# Patient Record
Sex: MALE | Race: WHITE
[De-identification: names, ages, dates, MRNs, and addresses within clinical notes are randomized per-mention and may not be internally consistent; named-entity substitution may affect disease eponyms.]

---

## 2020-02-09 ENCOUNTER — HOSPITAL ENCOUNTER (INPATIENT)
Dept: HOSPITAL 5 - ED | Age: 50
LOS: 1 days | Discharge: HOME | DRG: 205 | End: 2020-02-10
Attending: INTERNAL MEDICINE | Admitting: INTERNAL MEDICINE
Payer: COMMERCIAL

## 2020-02-09 DIAGNOSIS — E87.5: ICD-10-CM

## 2020-02-09 DIAGNOSIS — Z82.49: ICD-10-CM

## 2020-02-09 DIAGNOSIS — M94.0: Primary | ICD-10-CM

## 2020-02-09 DIAGNOSIS — D64.9: ICD-10-CM

## 2020-02-09 DIAGNOSIS — Z83.3: ICD-10-CM

## 2020-02-09 DIAGNOSIS — F17.210: ICD-10-CM

## 2020-02-09 DIAGNOSIS — E43: ICD-10-CM

## 2020-02-09 DIAGNOSIS — Z71.3: ICD-10-CM

## 2020-02-09 LAB
%HYPO/RBC NFR BLD AUTO: (no result) %
ALBUMIN SERPL-MCNC: 4.4 G/DL (ref 3.9–5)
ALT SERPL-CCNC: 38 UNITS/L (ref 7–56)
ANISOCYTOSIS BLD QL SMEAR: (no result)
APTT BLD: 22.6 SEC. (ref 24.2–36.6)
APTT BLD: 25.9 SEC. (ref 24.2–36.6)
BAND NEUTROPHILS # (MANUAL): 0 K/MM3
BAND NEUTROPHILS # (MANUAL): 0 K/MM3
BUN SERPL-MCNC: 14 MG/DL (ref 9–20)
BUN/CREAT SERPL: 20 %
CALCIUM SERPL-MCNC: 9.7 MG/DL (ref 8.4–10.2)
DACRYOCYTES BLD QL SMEAR: (no result)
DACRYOCYTES BLD QL SMEAR: (no result)
HCT VFR BLD CALC: 13 % (ref 35.5–45.6)
HCT VFR BLD CALC: 13.9 % (ref 35.5–45.6)
HEMOLYSIS INDEX: 2
HGB BLD-MCNC: 4.5 GM/DL (ref 11.8–15.2)
HGB BLD-MCNC: 4.7 GM/DL (ref 11.8–15.2)
INR PPP: 1.06 (ref 0.87–1.13)
INR PPP: 1.11 (ref 0.87–1.13)
MCHC RBC AUTO-ENTMCNC: 34 % (ref 32–34)
MCHC RBC AUTO-ENTMCNC: 35 % (ref 32–34)
MCV RBC AUTO: 97 FL (ref 84–94)
MCV RBC AUTO: 98 FL (ref 84–94)
MYELOCYTES # (MANUAL): 0 K/MM3
MYELOCYTES # (MANUAL): 0 K/MM3
PLATELET # BLD: 131 K/MM3 (ref 140–440)
PLATELET # BLD: 139 K/MM3 (ref 140–440)
PROMYELOCYTES # (MANUAL): 0 K/MM3
PROMYELOCYTES # (MANUAL): 0 K/MM3
RBC # BLD AUTO: 1.34 M/MM3 (ref 3.65–5.03)
RBC # BLD AUTO: 1.41 M/MM3 (ref 3.65–5.03)
SPHEROCYTES BLD QL SMEAR: (no result)
TARGETS BLD QL SMEAR: (no result)
TARGETS BLD QL SMEAR: (no result)
TOTAL CELLS COUNTED BLD: 100
TOTAL CELLS COUNTED BLD: 100

## 2020-02-09 PROCEDURE — 86880 COOMBS TEST DIRECT: CPT

## 2020-02-09 PROCEDURE — 85007 BL SMEAR W/DIFF WBC COUNT: CPT

## 2020-02-09 PROCEDURE — 86900 BLOOD TYPING SEROLOGIC ABO: CPT

## 2020-02-09 PROCEDURE — 84484 ASSAY OF TROPONIN QUANT: CPT

## 2020-02-09 PROCEDURE — 82607 VITAMIN B-12: CPT

## 2020-02-09 PROCEDURE — 85025 COMPLETE CBC W/AUTO DIFF WBC: CPT

## 2020-02-09 PROCEDURE — G0378 HOSPITAL OBSERVATION PER HR: HCPCS

## 2020-02-09 PROCEDURE — 85610 PROTHROMBIN TIME: CPT

## 2020-02-09 PROCEDURE — 93005 ELECTROCARDIOGRAM TRACING: CPT

## 2020-02-09 PROCEDURE — 99406 BEHAV CHNG SMOKING 3-10 MIN: CPT

## 2020-02-09 PROCEDURE — 80048 BASIC METABOLIC PNL TOTAL CA: CPT

## 2020-02-09 PROCEDURE — 82271 OCCULT BLOOD OTHER SOURCES: CPT

## 2020-02-09 PROCEDURE — 87806 HIV AG W/HIV1&2 ANTB W/OPTIC: CPT

## 2020-02-09 PROCEDURE — 80053 COMPREHEN METABOLIC PANEL: CPT

## 2020-02-09 PROCEDURE — 83735 ASSAY OF MAGNESIUM: CPT

## 2020-02-09 PROCEDURE — 83690 ASSAY OF LIPASE: CPT

## 2020-02-09 PROCEDURE — 83550 IRON BINDING TEST: CPT

## 2020-02-09 PROCEDURE — 84443 ASSAY THYROID STIM HORMONE: CPT

## 2020-02-09 PROCEDURE — 36415 COLL VENOUS BLD VENIPUNCTURE: CPT

## 2020-02-09 PROCEDURE — 82747 ASSAY OF FOLIC ACID RBC: CPT

## 2020-02-09 PROCEDURE — P9016 RBC LEUKOCYTES REDUCED: HCPCS

## 2020-02-09 PROCEDURE — 70450 CT HEAD/BRAIN W/O DYE: CPT

## 2020-02-09 PROCEDURE — 30233N1 TRANSFUSION OF NONAUTOLOGOUS RED BLOOD CELLS INTO PERIPHERAL VEIN, PERCUTANEOUS APPROACH: ICD-10-PCS

## 2020-02-09 PROCEDURE — 71046 X-RAY EXAM CHEST 2 VIEWS: CPT

## 2020-02-09 PROCEDURE — 93010 ELECTROCARDIOGRAM REPORT: CPT

## 2020-02-09 PROCEDURE — 86901 BLOOD TYPING SEROLOGIC RH(D): CPT

## 2020-02-09 PROCEDURE — 84100 ASSAY OF PHOSPHORUS: CPT

## 2020-02-09 PROCEDURE — 82728 ASSAY OF FERRITIN: CPT

## 2020-02-09 PROCEDURE — 85730 THROMBOPLASTIN TIME PARTIAL: CPT

## 2020-02-09 PROCEDURE — 86850 RBC ANTIBODY SCREEN: CPT

## 2020-02-09 PROCEDURE — 74177 CT ABD & PELVIS W/CONTRAST: CPT

## 2020-02-09 PROCEDURE — 86920 COMPATIBILITY TEST SPIN: CPT

## 2020-02-09 NOTE — EMERGENCY DEPARTMENT REPORT
ED Dizziness HPI





- General


Chief Complaint: Dizziness


Stated Complaint: HEADACHE/DIZZY/STOMACH PAIN


Time Seen by Provider: 02/09/20 12:36


Source: patient


Mode of arrival: Ambulatory


Limitations: No Limitations





- History of Present Illness


Initial Comments: 





50-year-old [place] male without any known past medical or surgical history 

presents to the hospital complaining of lightheadedness, vomiting, and pain.  

Patient states he's had a global headache daily since December.   The last 3 

weeks he's had intermittent vomiting primarily with meals but is able to 

tolerate liquids.  Patient endorses weight loss during this period.  He also has

any shortness of breath, chest pain, or weakness, focal numbness, blurry vision,

and abdominal cramps.  Patient denies fever, melena, hematochezia, hematemesis, 

alcohol abuse, or recent travel.  Patient does speak English however his primary

language is Thai and Creole.  He denies a past medical history of anemia or 

requiring a blood transfusion in the past.





- Related Data


                                Home Medications











 Medication  Instructions  Recorded  Confirmed  Last Taken


 


No Known Home Medications [No  02/09/20 02/09/20 Unknown





Reported Home Medications]    











                                    Allergies











Allergy/AdvReac Type Severity Reaction Status Date / Time


 


No Known Allergies Allergy   Unverified 03/22/16 16:13














ED Review of Systems


ROS: 


Stated complaint: HEADACHE/DIZZY/STOMACH PAIN


Other details as noted in HPI





Comment: All other systems reviewed and negative





ED Past Medical Hx





- Past Medical History


Previous Medical History?: No





- Surgical History


Past Surgical History?: No





- Social History


Smoking Status: Former Smoker


Substance Use Type: None





- Medications


Home Medications: 


                                Home Medications











 Medication  Instructions  Recorded  Confirmed  Last Taken  Type


 


No Known Home Medications [No  02/09/20 02/09/20 Unknown History





Reported Home Medications]     














ED Physical Exam





- General


Limitations: No Limitations





- Other


Other exam information: 





General: No limitations, patient is alert in no acute distress


Head exam: Atraumatic, normocephalic


Eyes exam: Normal appearance, extraocular movements intact, pupils equal and 

reactive to light


ENT: Moist mucous membrane


Neck exam: Normal inspection, full range of motion


Respiratory exam: Clear to auscultation bilateral, no wheezes, rales, crackles


Cardiovascular: Regular rate and rhythm


Abdomen: Soft, nondistended, and  nontender, with normal bowel sounds, no 

rebound, or guarding


Rectal: Guaiac-negative brown stool


Extremity: No deformity


Back: Normal Inspection, no CVA tenderness


Neurologic: Alert, oriented x3, speech clear, no gross motor or sensory deficit,

finger nose finger function intact


Psychiatric: Normal mood, affect 


Skin: No rash





ED Course


                                   Vital Signs











  02/09/20 02/09/20 02/09/20





  15:19 19:20 19:40


 


Temperature 97.6 F 98.5 F 98.5 F


 


Pulse Rate 110 H 101 H 105 H


 


Respiratory 20 16 16





Rate   


 


Blood Pressure   155/74


 


Blood Pressure 150/75 145/68 





[Left]   


 


O2 Sat by Pulse 100 98 99





Oximetry   














  02/09/20 02/09/20 02/09/20





  19:55 20:10 20:40


 


Temperature 98.2 F 98.5 F 98.5 F


 


Pulse Rate 91 H 96 H 92 H


 


Respiratory 16 18 18





Rate   


 


Blood Pressure 136/68 136/76 122/70


 


Blood Pressure   





[Left]   


 


O2 Sat by Pulse 98 98 96





Oximetry   














ED Medical Decision Making





- Lab Data


Result diagrams: 


                                 02/09/20 16:00





                                 02/09/20 14:25








                                   Lab Results











  02/09/20 02/09/20 02/09/20 Range/Units





  14:25 14:25 14:25 


 


WBC  8.9    (4.5-11.0)  K/mm3


 


RBC  1.34 L    (3.65-5.03)  M/mm3


 


Hgb  4.5 L*    (11.8-15.2)  gm/dl


 


Hct  13.0 L*    (35.5-45.6)  %


 


MCV  97 H    (84-94)  fl


 


MCH  34 H    (28-32)  pg


 


MCHC  35 H    (32-34)  %


 


RDW  33.9 H    (13.2-15.2)  %


 


Plt Count  139 L    (140-440)  K/mm3


 


Add Manual Diff  Complete    


 


Total Counted  100    


 


Seg Neuts % (Manual)  55.0    (40.0-70.0)  %


 


Band Neutrophils %  0    %


 


Lymphocytes % (Manual)  43.0 H    (13.4-35.0)  %


 


Reactive Lymphs % (Man)  0    %


 


Monocytes % (Manual)  1.0    (0.0-7.3)  %


 


Eosinophils % (Manual)  0    (0.0-4.3)  %


 


Basophils % (Manual)  1.0    (0.0-1.8)  %


 


Metamyelocytes %  0    %


 


Myelocytes %  0    %


 


Promyelocytes %  0    %


 


Blast Cells %  0    %


 


Nucleated RBC %  1.0 H    (0.0-0.9)  %


 


Seg Neutrophils # Man  4.9    (1.8-7.7)  K/mm3


 


Band Neutrophils #  0.0    K/mm3


 


Lymphocytes # (Manual)  3.8    (1.2-5.4)  K/mm3


 


Abs React Lymphs (Man)  0.0    K/mm3


 


Monocytes # (Manual)  0.1    (0.0-0.8)  K/mm3


 


Eosinophils # (Manual)  0.0    (0.0-0.4)  K/mm3


 


Basophils # (Manual)  0.1    (0.0-0.1)  K/mm3


 


Metamyelocytes #  0.0    K/mm3


 


Myelocytes #  0.0    K/mm3


 


Promyelocytes #  0.0    K/mm3


 


Blast Cells #  0.0    K/mm3


 


WBC Morphology  Not Reportable    


 


Hypersegmented Neuts  Not Reportable    


 


Hyposegmented Neuts  Not Reportable    


 


Hypogranular Neuts  Not Reportable    


 


Smudge Cells  Not Reportable    


 


Toxic Granulation  Not Reportable    


 


Toxic Vacuolation  Not Reportable    


 


Dohle Bodies  Not Reportable    


 


Pelger-Huet Anomaly  Not Reportable    


 


Estefania Rods  Not Reportable    


 


Platelet Estimate  Consistent w auto    


 


Clumped Platelets  Not Reportable    


 


Plt Clumps, EDTA  Not Reportable    


 


Large Platelets  Not Reportable    


 


Giant Platelets  Not Reportable    


 


Platelet Satelliting  Not Reportable    


 


Plt Morphology Comment  Not Reportable    


 


RBC Morphology  Not Reportable    


 


Dimorphic RBCs  Not Reportable    


 


Polychromasia  Few    


 


Hypochromasia  1+    


 


Poikilocytosis  Not Reportable    


 


Anisocytosis  Not Reportable    


 


Microcytosis  Not Reportable    


 


Macrocytosis  Not Reportable    


 


Spherocytes  Few    


 


Pappenheimer Bodies  Not Reportable    


 


Sickle Cells  Not Reportable    


 


Target Cells  1+    


 


Tear Drop Cells  1+    


 


Ovalocytes  Not Reportable    


 


Helmet Cells  Not Reportable    


 


[place] Bodies  Not Reportable    


 


Cabot Rings  Not Reportable    


 


[place] Cells  Not Reportable    


 


Bite Cells  Not Reportable    


 


Crenated Cell  Not Reportable    


 


Elliptocytes  1+    


 


Acanthocytes (Spur)  Not Reportable    


 


Rouleaux  Not Reportable    


 


Hemoglobin C Crystals  Not Reportable    


 


Schistocytes  Not Reportable    


 


Malaria parasites  Not Reportable    


 


Guille Bodies  Not Reportable    


 


Hem Pathologist Commnt  No    


 


PT   14.4   (12.2-14.9)  Sec.


 


INR   1.11   (0.87-1.13)  


 


APTT   22.6 L   (24.2-36.6)  Sec.


 


Sodium    140  (137-145)  mmol/L


 


Potassium    4.0  (3.6-5.0)  mmol/L


 


Chloride    99.9  ()  mmol/L


 


Carbon Dioxide    25  (22-30)  mmol/L


 


Anion Gap    19  mmol/L


 


BUN    14  (9-20)  mg/dL


 


Creatinine    0.7 L  (0.8-1.5)  mg/dL


 


Estimated GFR    > 60  ml/min


 


BUN/Creatinine Ratio    20  %


 


Glucose    110 H  ()  mg/dL


 


Calcium    9.7  (8.4-10.2)  mg/dL


 


Phosphorus    4.40  (2.5-4.5)  mg/dL


 


Magnesium    1.80  (1.7-2.3)  mg/dL


 


Total Bilirubin    1.30 H  (0.1-1.2)  mg/dL


 


AST    76 H  (5-40)  units/L


 


ALT    38  (7-56)  units/L


 


Alkaline Phosphatase    62  ()  units/L


 


Troponin T    < 0.010  (0.00-0.029)  ng/mL


 


Total Protein    7.4  (6.3-8.2)  g/dL


 


Albumin    4.4  (3.9-5)  g/dL


 


Albumin/Globulin Ratio    1.5  %


 


Lipase     (13-60)  units/L


 


TSH     (0.270-4.200)  mlU/mL


 


Blood Type     


 


Antibody Screen     


 


Crossmatch     














  02/09/20 02/09/20 02/09/20 Range/Units





  14:25 16:00 16:00 


 


WBC    8.5  (4.5-11.0)  K/mm3


 


RBC    1.41 L  (3.65-5.03)  M/mm3


 


Hgb    4.7 L*  (11.8-15.2)  gm/dl


 


Hct    13.9 L*  (35.5-45.6)  %


 


MCV    98 H  (84-94)  fl


 


MCH    33 H  (28-32)  pg


 


MCHC    34  (32-34)  %


 


RDW    34.6 H  (13.2-15.2)  %


 


Plt Count    131 L  (140-440)  K/mm3


 


Add Manual Diff    Complete  


 


Total Counted    100  


 


Seg Neuts % (Manual)    43.0  (40.0-70.0)  %


 


Band Neutrophils %    0  %


 


Lymphocytes % (Manual)    53.0 H  (13.4-35.0)  %


 


Reactive Lymphs % (Man)    0  %


 


Monocytes % (Manual)    3.0  (0.0-7.3)  %


 


Eosinophils % (Manual)    1.0  (0.0-4.3)  %


 


Basophils % (Manual)    0  (0.0-1.8)  %


 


Metamyelocytes %    0  %


 


Myelocytes %    0  %


 


Promyelocytes %    0  %


 


Blast Cells %    0  %


 


Nucleated RBC %    Not Reportable  (0.0-0.9)  %


 


Seg Neutrophils # Man    3.7  (1.8-7.7)  K/mm3


 


Band Neutrophils #    0.0  K/mm3


 


Lymphocytes # (Manual)    4.5  (1.2-5.4)  K/mm3


 


Abs React Lymphs (Man)    0.0  K/mm3


 


Monocytes # (Manual)    0.3  (0.0-0.8)  K/mm3


 


Eosinophils # (Manual)    0.1  (0.0-0.4)  K/mm3


 


Basophils # (Manual)    0.0  (0.0-0.1)  K/mm3


 


Metamyelocytes #    0.0  K/mm3


 


Myelocytes #    0.0  K/mm3


 


Promyelocytes #    0.0  K/mm3


 


Blast Cells #    0.0  K/mm3


 


WBC Morphology    Not Reportable  


 


Hypersegmented Neuts    Not Reportable  


 


Hyposegmented Neuts    Not Reportable  


 


Hypogranular Neuts    Not Reportable  


 


Smudge Cells    Not Reportable  


 


Toxic Granulation    Not Reportable  


 


Toxic Vacuolation    Not Reportable  


 


Dohle Bodies    Not Reportable  


 


Pelger-Huet Anomaly    Not Reportable  


 


Estefania Rods    Not Reportable  


 


Platelet Estimate    Not Reportable  


 


Clumped Platelets    Not Reportable  


 


Plt Clumps, EDTA    Not Reportable  


 


Large Platelets    Not Reportable  


 


Giant Platelets    Not Reportable  


 


Platelet Satelliting    Not Reportable  


 


Plt Morphology Comment    Not Reportable  


 


RBC Morphology    Not Reportable  


 


Dimorphic RBCs    Not Reportable  


 


Polychromasia    1+  


 


Hypochromasia    Not Reportable  


 


Poikilocytosis    Not Reportable  


 


Anisocytosis    1+  


 


Microcytosis    Not Reportable  


 


Macrocytosis    Not Reportable  


 


Spherocytes    1+  


 


Pappenheimer Bodies    Not Reportable  


 


Sickle Cells    Not Reportable  


 


Target Cells    1+  


 


Tear Drop Cells    1+  


 


Ovalocytes    Not Reportable  


 


Helmet Cells    Not Reportable  


 


[place] Bodies    Not Reportable  


 


Cabot Rings    Not Reportable  


 


[place] Cells    Not Reportable  


 


Bite Cells    Not Reportable  


 


Crenated Cell    Not Reportable  


 


Elliptocytes    1+  


 


Acanthocytes (Spur)    Not Reportable  


 


Rouleaux    Not Reportable  


 


Hemoglobin C Crystals    Not Reportable  


 


Schistocytes    Not Reportable  


 


Malaria parasites    Not Reportable  


 


Guille Bodies    Not Reportable  


 


Hem Pathologist Commnt    No  


 


PT     (12.2-14.9)  Sec.


 


INR     (0.87-1.13)  


 


APTT     (24.2-36.6)  Sec.


 


Sodium     (137-145)  mmol/L


 


Potassium     (3.6-5.0)  mmol/L


 


Chloride     ()  mmol/L


 


Carbon Dioxide     (22-30)  mmol/L


 


Anion Gap     mmol/L


 


BUN     (9-20)  mg/dL


 


Creatinine     (0.8-1.5)  mg/dL


 


Estimated GFR     ml/min


 


BUN/Creatinine Ratio     %


 


Glucose     ()  mg/dL


 


Calcium     (8.4-10.2)  mg/dL


 


Phosphorus     (2.5-4.5)  mg/dL


 


Magnesium     (1.7-2.3)  mg/dL


 


Total Bilirubin     (0.1-1.2)  mg/dL


 


AST     (5-40)  units/L


 


ALT     (7-56)  units/L


 


Alkaline Phosphatase     ()  units/L


 


Troponin T   < 0.010   (0.00-0.029)  ng/mL


 


Total Protein     (6.3-8.2)  g/dL


 


Albumin     (3.9-5)  g/dL


 


Albumin/Globulin Ratio     %


 


Lipase     (13-60)  units/L


 


TSH  1.680    (0.270-4.200)  mlU/mL


 


Blood Type     


 


Antibody Screen     


 


Crossmatch     














  02/09/20 02/09/20 02/09/20 Range/Units





  16:00 16:00 16:00 


 


WBC     (4.5-11.0)  K/mm3


 


RBC     (3.65-5.03)  M/mm3


 


Hgb     (11.8-15.2)  gm/dl


 


Hct     (35.5-45.6)  %


 


MCV     (84-94)  fl


 


MCH     (28-32)  pg


 


MCHC     (32-34)  %


 


RDW     (13.2-15.2)  %


 


Plt Count     (140-440)  K/mm3


 


Add Manual Diff     


 


Total Counted     


 


Seg Neuts % (Manual)     (40.0-70.0)  %


 


Band Neutrophils %     %


 


Lymphocytes % (Manual)     (13.4-35.0)  %


 


Reactive Lymphs % (Man)     %


 


Monocytes % (Manual)     (0.0-7.3)  %


 


Eosinophils % (Manual)     (0.0-4.3)  %


 


Basophils % (Manual)     (0.0-1.8)  %


 


Metamyelocytes %     %


 


Myelocytes %     %


 


Promyelocytes %     %


 


Blast Cells %     %


 


Nucleated RBC %     (0.0-0.9)  %


 


Seg Neutrophils # Man     (1.8-7.7)  K/mm3


 


Band Neutrophils #     K/mm3


 


Lymphocytes # (Manual)     (1.2-5.4)  K/mm3


 


Abs React Lymphs (Man)     K/mm3


 


Monocytes # (Manual)     (0.0-0.8)  K/mm3


 


Eosinophils # (Manual)     (0.0-0.4)  K/mm3


 


Basophils # (Manual)     (0.0-0.1)  K/mm3


 


Metamyelocytes #     K/mm3


 


Myelocytes #     K/mm3


 


Promyelocytes #     K/mm3


 


Blast Cells #     K/mm3


 


WBC Morphology     


 


Hypersegmented Neuts     


 


Hyposegmented Neuts     


 


Hypogranular Neuts     


 


Smudge Cells     


 


Toxic Granulation     


 


Toxic Vacuolation     


 


Dohle Bodies     


 


Pelger-Huet Anomaly     


 


Estefania Rods     


 


Platelet Estimate     


 


Clumped Platelets     


 


Plt Clumps, EDTA     


 


Large Platelets     


 


Giant Platelets     


 


Platelet Satelliting     


 


Plt Morphology Comment     


 


RBC Morphology     


 


Dimorphic RBCs     


 


Polychromasia     


 


Hypochromasia     


 


Poikilocytosis     


 


Anisocytosis     


 


Microcytosis     


 


Macrocytosis     


 


Spherocytes     


 


Pappenheimer Bodies     


 


Sickle Cells     


 


Target Cells     


 


Tear Drop Cells     


 


Ovalocytes     


 


Helmet Cells     


 


[place] Bodies     


 


Cabot Rings     


 


[place] Cells     


 


Bite Cells     


 


Crenated Cell     


 


Elliptocytes     


 


Acanthocytes (Spur)     


 


Rouleaux     


 


Hemoglobin C Crystals     


 


Schistocytes     


 


Malaria parasites     


 


Guille Bodies     


 


Hem Pathologist Commnt     


 


PT    13.9  (12.2-14.9)  Sec.


 


INR    1.06  (0.87-1.13)  


 


APTT    25.9  (24.2-36.6)  Sec.


 


Sodium     (137-145)  mmol/L


 


Potassium     (3.6-5.0)  mmol/L


 


Chloride     ()  mmol/L


 


Carbon Dioxide     (22-30)  mmol/L


 


Anion Gap     mmol/L


 


BUN     (9-20)  mg/dL


 


Creatinine     (0.8-1.5)  mg/dL


 


Estimated GFR     ml/min


 


BUN/Creatinine Ratio     %


 


Glucose     ()  mg/dL


 


Calcium     (8.4-10.2)  mg/dL


 


Phosphorus     (2.5-4.5)  mg/dL


 


Magnesium  1.90    (1.7-2.3)  mg/dL


 


Total Bilirubin     (0.1-1.2)  mg/dL


 


AST     (5-40)  units/L


 


ALT     (7-56)  units/L


 


Alkaline Phosphatase     ()  units/L


 


Troponin T     (0.00-0.029)  ng/mL


 


Total Protein     (6.3-8.2)  g/dL


 


Albumin     (3.9-5)  g/dL


 


Albumin/Globulin Ratio     %


 


Lipase  10 L    (13-60)  units/L


 


TSH     (0.270-4.200)  mlU/mL


 


Blood Type   O POSITIVE   


 


Antibody Screen   Negative   


 


Crossmatch   See Detail   














- EKG Data


-: EKG Interpreted by Me


EKG shows normal: sinus rhythm, ST-T waves (no stemi or T-wave inversion)


Rate: normal (83)





- Radiology Data


Radiology results: report reviewed





CHEST 2 VIEWS





INDICATION:


CP.





COMPARISON:


3/22/2016





FINDINGS:


Support devices: None.





Heart: Within normal limits.


Lungs/Pleura: No acute air space or interstitial disease. No significant pleural

 effusion.





IMPRESSION: No acute finding








NONENHANCED CT SCAN OF THE HEAD:





INDICATION / CLINICAL INFORMATION:


50 years Male; ha x 3 months, n/v x 3 weeks.





TECHNIQUE: Routine CT head without contrast. All CT scans at this location are 

performed using CT


dose reduction for ALARA by means of automated exposure control.





COMPARISON:


None.





FINDINGS:





BRAIN / INTRACRANIAL CONTENTS: No acute hemorrhage, mass effect, midline shift, 

hydrocephalus, or


acute, large territorial infarct. Mild volume loss is seen in the cerebellar 

vermis and cerebellar


hemispheres. Cerebral hemispheres are normal.





CRANIOCERVICAL JUNCTION: No significant abnormality.





ORBITS: No significant abnormality of visualized orbits.





SINUSES / MASTOIDS: Left maxillary sinus is opacified with thickened bony walls 

suggesting chronic


inflammatory changes.





ADDITIONAL FINDINGS: None.





IMPRESSION:


Normal CT scan of the brain





Opacified left maxillary sinus with thickened bony walls suggesting chronic 

inflammatory change








CT abdomen pelvis w con





INDICATION / CLINICAL INFORMATION:


vomiting x 3 weeks, weight loss anemia.





TECHNIQUE:


All CT scans at this location are performed using CT dose reduction for ALARA by

 means of automated


exposure control.





COMPARISON:


None available.





FINDINGS:


Limited lower thoracic images are negative.





ABDOMEN:


The gallbladder, liver, spleen, pancreas and adrenal glands are normal.


No urinary calculi or hydronephrosis. The kidneys are normal.





No mesenteric or retroperitoneal adenopathy.


The small bowel is nondilated.





Pelvis:


No fluid collections are seen in the pelvis. No acute inflammatory findings.





Skeletal structures are normal.





IMPRESSION:


1. No acute findings.








- Medical Decision Making





Patient has severe symptomatic anemia of unknown cause.  Guaiac-negative.  

Reports no previous history of anemia.  CT head, chest x-ray, CT and pelvis with

 IV contrast did not show any acute abnormalities.  Cause of patient's 

persistent intermittent vomiting the last 3 weeks and weight loss unknown at t

his time as well.  Patient will be admitted to the hospitalist service for 

completion of the blood transfusion and further evaluation.  3 units of PRBCs 

ordered in the ED.





- Differential Diagnosis


anemia, arrhythmia, cancer, infection


Critical Care Time: No


Critical care attestation.: 


If time is entered above; I have spent that time in minutes in the direct care 

of this critically ill patient, excluding procedure time.








ED Disposition


Clinical Impression: 


 Severe anemia, Nausea and vomiting, Headache, Lightheadedness





Disposition: DC-09 OP ADMIT IP TO THIS HOSP


Is pt being admited?: Yes


Condition: Stable


Time of Disposition: 19:55 (Dr Thomas/hosp)

## 2020-02-09 NOTE — EVENT NOTE
ED Screening Note


ED Screening Note: 





pt presents for headache


chest pain 


generalized weakness


dizziness like the room is spinning


three weeks ago 


occasional vomiting


no diarrhea 


no sob 


PMHx none


no daily meds


no allergies to meds





This initial assessment/diagnostic orders/clinical plan/treatment(s) is/are 

subject to change based on patients health status, clinical progression and re-

assessment by fellow clinical providers in the ED. Further treatment and workup 

at subsequent clinical providers discretion. Patient/guardian urged not to elope

from the ED as their condition may be serious if not clinically assessed and 

managed. 





Initial orders include: andrea morris

## 2020-02-09 NOTE — CAT SCAN REPORT
CT abdomen pelvis w con 



INDICATION / CLINICAL INFORMATION:

vomiting x 3 weeks, weight loss anemia.



TECHNIQUE:

All CT scans at this location are performed using CT dose reduction for ALARA by means of automated e
xposure control. 



COMPARISON:

None available.



FINDINGS:

Limited lower thoracic images are negative.



ABDOMEN:

The gallbladder, liver, spleen, pancreas and adrenal glands are normal.

No urinary calculi or hydronephrosis. The kidneys are normal.



No mesenteric or retroperitoneal adenopathy.

The small bowel is nondilated.



Pelvis:

No fluid collections are seen in the pelvis. No acute inflammatory findings.



Skeletal structures are normal.



IMPRESSION:

1. No acute findings. 



Signer Name: Colby Lu MD 

Signed: 2/9/2020 6:46 PM

 Workstation Name: [id number]

## 2020-02-09 NOTE — XRAY REPORT
CHEST 2 VIEWS 



INDICATION: 

CP.



COMPARISON: 

3/22/2016



FINDINGS:

Support devices: None.



Heart: Within normal limits. 

Lungs/Pleura: No acute air space or interstitial disease.   No significant pleural effusion. 



IMPRESSION:  No acute findings.



Signer Name: Arnold Coleman MD 

Signed: 2/9/2020 2:10 PM

 Workstation Name: [id number]

## 2020-02-09 NOTE — CAT SCAN REPORT
NONENHANCED CT SCAN OF THE HEAD: 



INDICATION / CLINICAL INFORMATION:

50 years Male; ha x 3 months, n/v x 3 weeks.



TECHNIQUE: Routine CT head without contrast. All CT scans at this location are performed using CT dos
e reduction for ALARA by means of automated exposure control. 



COMPARISON: 

None.



FINDINGS:



BRAIN / INTRACRANIAL CONTENTS: No acute hemorrhage, mass effect, midline shift, hydrocephalus, or acu
te, large territorial infarct. Mild volume loss is seen in the cerebellar vermis and cerebellar hemis
pheres. Cerebral hemispheres are normal.



CRANIOCERVICAL JUNCTION: No significant abnormality.



ORBITS: No significant abnormality of visualized orbits.



SINUSES / MASTOIDS: Left maxillary sinus is opacified with thickened bony walls suggesting chronic in
flammatory changes.



ADDITIONAL FINDINGS: None. 



IMPRESSION:

Normal CT scan of the brain



Opacified left maxillary sinus with thickened bony walls suggesting chronic inflammatory changes



Signer Name: Vivien Hawthorne MD 

Signed: 2/9/2020 7:22 PM

 Workstation Name: [id number]

## 2020-02-09 NOTE — HISTORY AND PHYSICAL REPORT
History of Present Illness


Date of examination: 02/09/20


Date of admission: 


2/09/20





Chief complaint: 


"My head hurts and I do not feel good"





History of present illness: 


Patient is 50-year-old male patient with no prior medical history who presents 

to  ER with complaints of headache x2 months,  and chest pain x3 weeks.  

Patient reports that his headaches are accompanied by bouts of dizziness and 

weakness and these symptoms consistently only occur  whilst at work with usual 

activity. His chest pain occurs intermittently  with exertion, sometimes 

precipitated with nausea and or vomiting, it is without radiation and relieved 

by rest. He reports taking OTC Excedrin for his headache but have not sought 

additional care or have a PCP. 








Past History


Past Medical History: No medical history


Past Surgical History: No surgical history


Social history: smoking (x 15 years)


Family history: diabetes, hypertension





Medications and Allergies


                                    Allergies











Allergy/AdvReac Type Severity Reaction Status Date / Time


 


No Known Allergies Allergy   Unverified 03/22/16 16:13











                                Home Medications











 Medication  Instructions  Recorded  Confirmed  Last Taken  Type


 


No Known Home Medications [No  02/09/20 02/09/20 Unknown History





Reported Home Medications]     














Review of Systems


All systems: negative


Constitutional: chills, fatigue, weakness


Cardiovascular: lightheadedness


Neurological: weakness





Exam





- Physical Exam


Narrative exam: 


- Physical Exam


Narrative exam: 


General appearance: Present: No distress noted





- EENT


Eyes: Present: PERRL


ENT: hearing intact, clear oral mucosa





- Neck


Neck: Present: supple, normal ROM





- Respiratory


Respiratory effort: normal


Respiratory: bilateral: Clear to auscultation





- Cardiovascular


Heart rate:98


Heart Sounds: Present: S1 & S2.  Absent: rub, click





- Extremities


Extremities: pulses symmetrical, No edema


Peripheral Pulses: within normal limits





- Abdominal


General gastrointestinal: Present: , non-distended, normal bowel sounds


genitourinary: Present: normal





- Integumentary


Integumentary: Present: clear, warm, dry





- Musculoskeletal


Musculoskeletal: gait normal, strength equal bilaterally





- Psychiatric


Psychiatric: appropriate mood/affect, intact judgment & insight





- Neurologic


Neurologic: CNII-XII intact, moves all extremities








- Constitutional


Vitals: 


                                        











Temp Pulse Resp BP Pulse Ox


 


 98.5 F   105 H  16   155/74   99 


 


 02/09/20 19:40  02/09/20 19:40  02/09/20 19:40  02/09/20 19:40  02/09/20 19:40














Results





- Labs


CBC & Chem 7: 


                                 02/09/20 16:00





                                 02/09/20 14:25


Labs: 


                             Laboratory Last Values











WBC  8.5 K/mm3 (4.5-11.0)   02/09/20  16:00    


 


RBC  1.41 M/mm3 (3.65-5.03)  L  02/09/20  16:00    


 


Hgb  4.7 gm/dl (11.8-15.2)  L*  02/09/20  16:00    


 


Hct  13.9 % (35.5-45.6)  L*  02/09/20  16:00    


 


MCV  98 fl (84-94)  H  02/09/20  16:00    


 


MCH  33 pg (28-32)  H  02/09/20  16:00    


 


MCHC  34 % (32-34)   02/09/20  16:00    


 


RDW  34.6 % (13.2-15.2)  H  02/09/20  16:00    


 


Plt Count  131 K/mm3 (140-440)  L  02/09/20  16:00    


 


Add Manual Diff  Complete   02/09/20  16:00    


 


Total Counted  100   02/09/20  16:00    


 


Seg Neuts % (Manual)  43.0 % (40.0-70.0)   02/09/20  16:00    


 


Band Neutrophils %  0 %  02/09/20  16:00    


 


Lymphocytes % (Manual)  53.0 % (13.4-35.0)  H  02/09/20  16:00    


 


Reactive Lymphs % (Man)  0 %  02/09/20  16:00    


 


Monocytes % (Manual)  3.0 % (0.0-7.3)   02/09/20  16:00    


 


Eosinophils % (Manual)  1.0 % (0.0-4.3)   02/09/20  16:00    


 


Basophils % (Manual)  0 % (0.0-1.8)   02/09/20  16:00    


 


Metamyelocytes %  0 %  02/09/20  16:00    


 


Myelocytes %  0 %  02/09/20  16:00    


 


Promyelocytes %  0 %  02/09/20  16:00    


 


Blast Cells %  0 %  02/09/20  16:00    


 


Nucleated RBC %  Not Reportable   02/09/20  16:00    


 


Seg Neutrophils # Man  3.7 K/mm3 (1.8-7.7)   02/09/20  16:00    


 


Band Neutrophils #  0.0 K/mm3  02/09/20  16:00    


 


Lymphocytes # (Manual)  4.5 K/mm3 (1.2-5.4)   02/09/20  16:00    


 


Abs React Lymphs (Man)  0.0 K/mm3  02/09/20  16:00    


 


Monocytes # (Manual)  0.3 K/mm3 (0.0-0.8)   02/09/20  16:00    


 


Eosinophils # (Manual)  0.1 K/mm3 (0.0-0.4)   02/09/20  16:00    


 


Basophils # (Manual)  0.0 K/mm3 (0.0-0.1)   02/09/20  16:00    


 


Metamyelocytes #  0.0 K/mm3  02/09/20  16:00    


 


Myelocytes #  0.0 K/mm3  02/09/20  16:00    


 


Promyelocytes #  0.0 K/mm3  02/09/20  16:00    


 


Blast Cells #  0.0 K/mm3  02/09/20  16:00    


 


WBC Morphology  Not Reportable   02/09/20  16:00    


 


Hypersegmented Neuts  Not Reportable   02/09/20  16:00    


 


Hyposegmented Neuts  Not Reportable   02/09/20  16:00    


 


Hypogranular Neuts  Not Reportable   02/09/20  16:00    


 


Smudge Cells  Not Reportable   02/09/20  16:00    


 


Toxic Granulation  Not Reportable   02/09/20  16:00    


 


Toxic Vacuolation  Not Reportable   02/09/20  16:00    


 


Dohle Bodies  Not Reportable   02/09/20  16:00    


 


Pelger-Huet Anomaly  Not Reportable   02/09/20  16:00    


 


Estefania Rods  Not Reportable   02/09/20  16:00    


 


Platelet Estimate  Not Reportable   02/09/20  16:00    


 


Clumped Platelets  Not Reportable   02/09/20  16:00    


 


Plt Clumps, EDTA  Not Reportable   02/09/20  16:00    


 


Large Platelets  Not Reportable   02/09/20  16:00    


 


Giant Platelets  Not Reportable   02/09/20  16:00    


 


Platelet Satelliting  Not Reportable   02/09/20  16:00    


 


Plt Morphology Comment  Not Reportable   02/09/20  16:00    


 


RBC Morphology  Not Reportable   02/09/20  16:00    


 


Dimorphic RBCs  Not Reportable   02/09/20  16:00    


 


Polychromasia  1+   02/09/20  16:00    


 


Hypochromasia  Not Reportable   02/09/20  16:00    


 


Poikilocytosis  Not Reportable   02/09/20  16:00    


 


Anisocytosis  1+   02/09/20  16:00    


 


Microcytosis  Not Reportable   02/09/20  16:00    


 


Macrocytosis  Not Reportable   02/09/20  16:00    


 


Spherocytes  1+   02/09/20  16:00    


 


Pappenheimer Bodies  Not Reportable   02/09/20  16:00    


 


Sickle Cells  Not Reportable   02/09/20  16:00    


 


Target Cells  1+   02/09/20  16:00    


 


Tear Drop Cells  1+   02/09/20  16:00    


 


Ovalocytes  Not Reportable   02/09/20  16:00    


 


Helmet Cells  Not Reportable   02/09/20  16:00    


 


Steen-Grasston Bodies  Not Reportable   02/09/20  16:00    


 


Cabot Rings  Not Reportable   02/09/20  16:00    


 


Moniac Cells  Not Reportable   02/09/20  16:00    


 


Bite Cells  Not Reportable   02/09/20  16:00    


 


Crenated Cell  Not Reportable   02/09/20  16:00    


 


Elliptocytes  1+   02/09/20  16:00    


 


Acanthocytes (Spur)  Not Reportable   02/09/20  16:00    


 


Rouleaux  Not Reportable   02/09/20  16:00    


 


Hemoglobin C Crystals  Not Reportable   02/09/20  16:00    


 


Schistocytes  Not Reportable   02/09/20  16:00    


 


Malaria parasites  Not Reportable   02/09/20  16:00    


 


Guille Bodies  Not Reportable   02/09/20  16:00    


 


Hem Pathologist Commnt  No   02/09/20  16:00    


 


PT  13.9 Sec. (12.2-14.9)   02/09/20  16:00    


 


INR  1.06  (0.87-1.13)   02/09/20  16:00    


 


APTT  25.9 Sec. (24.2-36.6)   02/09/20  16:00    


 


Sodium  140 mmol/L (137-145)   02/09/20  14:25    


 


Potassium  4.0 mmol/L (3.6-5.0)   02/09/20  14:25    


 


Chloride  99.9 mmol/L ()   02/09/20  14:25    


 


Carbon Dioxide  25 mmol/L (22-30)   02/09/20  14:25    


 


Anion Gap  19 mmol/L  02/09/20  14:25    


 


BUN  14 mg/dL (9-20)   02/09/20  14:25    


 


Creatinine  0.7 mg/dL (0.8-1.5)  L  02/09/20  14:25    


 


Estimated GFR  > 60 ml/min  02/09/20  14:25    


 


BUN/Creatinine Ratio  20 %  02/09/20  14:25    


 


Glucose  110 mg/dL ()  H  02/09/20  14:25    


 


Calcium  9.7 mg/dL (8.4-10.2)   02/09/20  14:25    


 


Phosphorus  4.40 mg/dL (2.5-4.5)   02/09/20  14:25    


 


Magnesium  1.90 mg/dL (1.7-2.3)   02/09/20  16:00    


 


Total Bilirubin  1.30 mg/dL (0.1-1.2)  H  02/09/20  14:25    


 


AST  76 units/L (5-40)  H  02/09/20  14:25    


 


ALT  38 units/L (7-56)   02/09/20  14:25    


 


Alkaline Phosphatase  62 units/L ()   02/09/20  14:25    


 


Troponin T  < 0.010 ng/mL (0.00-0.029)   02/09/20  16:00    


 


Total Protein  7.4 g/dL (6.3-8.2)   02/09/20  14:25    


 


Albumin  4.4 g/dL (3.9-5)   02/09/20  14:25    


 


Albumin/Globulin Ratio  1.5 %  02/09/20  14:25    


 


Lipase  10 units/L (13-60)  L  02/09/20  16:00    


 


TSH  1.680 mlU/mL (0.270-4.200)   02/09/20  14:25    


 


Blood Type  O POSITIVE   02/09/20  16:00    


 


Antibody Screen  Negative   02/09/20  16:00    


 


Crossmatch  See Detail   02/09/20  16:00    














- Imaging and Cardiology


EKG: report reviewed (Sinus rhythm)


Imaging and Cardiology: 


CT abdomen pelvis w con INDICATION / 





CLINICAL INFORMATION: vomiting x 3 weeks, weight loss anemia. TECHNIQUE: All CT 

scans at this location are performed using CT dose reduction for ALARA by means 

of automated exposure control. COMPARISON: None available. 





FINDINGS: Limited lower thoracic images are negative. ABDOMEN: The gallbladder, 

liver, spleen, pancreas and adrenal glands are normal. No urinary calculi or 

hydronephrosis. The kidneys are normal. No mesenteric or retroperitoneal 

adenopathy. The small bowel is nondilated. Pelvis: No fluid collections are seen

in the pelvis. No acute inflammatory findings. Skeletal structures are normal. 





IMPRESSION: 1. No acute findings. 








 CT SCAN OF THE HEAD: 





INDICATION / CLINICAL INFORMATION: 50 years Male; ha x 3 months, n/v x 3 weeks. 

TECHNIQUE: Routine CT head without contrast. All CT scans at this location are 

performed using CT dose reduction for ALARA by means of automated exposure contr

ol. COMPARISON: None. 





FINDINGS: BRAIN / INTRACRANIAL CONTENTS: No acute hemorrhage, mass effect, m

idline shift, hydrocephalus, or acute, large territorial infarct. Mild volume 

loss is seen in the cerebellar vermis and cerebellar hemispheres. Cerebral 

hemispheres are normal. CRANIOCERVICAL JUNCTION: No significant abnormality. 

ORBITS: No significant abnormality of visualized orbits. SINUSES / MASTOIDS: 

Left maxillary sinus is opacified with thickened bony walls suggesting chronic 

inflammatory changes. 





ADDITIONAL FINDINGS: None. 





IMPRESSION: Normal CT scan of the brain Opacified left maxillary sinus with 

thickened bony walls suggesting chronic inflammatory changes 











CHEST 2 VIEWS





INDICATION:


CP.





COMPARISON:


3/22/2016





FINDINGS:


Support devices: None.





Heart: Within normal limits.


Lungs/Pleura: No acute air space or interstitial disease. No significant pleural

effusion.





IMPRESSION: No acute finding 








Assessment and Plan


Assessment and plan: 





Anemia 


-Severe


-Hb: 4.7g/dl 


-3 units transfused in the ER 


-Monitor labs


-Heme consult





Headache


-x 2 months


-Accompanied c/ dizziness


-CT head negative


-PRN pain med





Nausea/ vomiting


-Anti-emetic prn


-IV fluids


-Supportive care





Chest pain


-x 3 weeks


- will admit to telemetry bed


- monitor with serial CE and EKG


- will place on Aspirin, statin


- as needed SL NTG and iv morphin for pain


- Monitor BP, 


 


 DVT prophylaxis


-SCDs bilateral extremities


-Patient ambulatory





Advance Directives: No


VTE prophylaxis?: Mechanical


Plan of care discussed with patient/family: Yes

## 2020-02-10 VITALS — SYSTOLIC BLOOD PRESSURE: 128 MMHG | DIASTOLIC BLOOD PRESSURE: 67 MMHG

## 2020-02-10 LAB
ANISOCYTOSIS BLD QL SMEAR: (no result)
BAND NEUTROPHILS # (MANUAL): 0 K/MM3
BUN SERPL-MCNC: 11 MG/DL (ref 9–20)
BUN/CREAT SERPL: 14 %
CALCIUM SERPL-MCNC: 9.1 MG/DL (ref 8.4–10.2)
HCT VFR BLD CALC: 22.2 % (ref 35.5–45.6)
HEMOLYSIS INDEX: 8
HGB BLD-MCNC: 7.8 GM/DL (ref 11.8–15.2)
IRON SERPL-MCNC: 151 UG/DL (ref 49–181)
MACROCYTES BLD QL SMEAR: (no result)
MCHC RBC AUTO-ENTMCNC: 35 % (ref 32–34)
MCV RBC AUTO: 96 FL (ref 84–94)
MYELOCYTES # (MANUAL): 0 K/MM3
OVALOCYTES BLD QL SMEAR: (no result)
PLATELET # BLD: 104 K/MM3 (ref 140–440)
POIKILOCYTOSIS BLD QL SMEAR: (no result)
PROMYELOCYTES # (MANUAL): 0 K/MM3
RBC # BLD AUTO: 2.3 M/MM3 (ref 3.65–5.03)
TIBC SERPL-MCNC: 128 MCG/DL (ref 250–450)
TOTAL CELLS COUNTED BLD: 100

## 2020-02-10 RX ADMIN — FAMOTIDINE SCH MG: 10 INJECTION, SOLUTION INTRAVENOUS at 09:37

## 2020-02-10 RX ADMIN — FAMOTIDINE SCH MG: 10 INJECTION, SOLUTION INTRAVENOUS at 01:27

## 2020-02-10 NOTE — GASTROENTEROLOGY CONSULTATION
<DURAN TORRES - Last Filed: 02/10/20 13:38>





History of Present Illness





- Reason for Consult


Consult date: 02/10/20


anemia


Requesting physician: LIZ MEZA





- History of Present Illness


Patient is a 51 y/o male with PMH of tobacco dependency who presented to ED with

c/o headache, lightheadedness, weakness, and CP with symptoms exacerbated with 

physical activity. Upon admission, he was found to be severely anemic with H/H 

4.5/13.0 to which he was admitted and GI has been consulted. This morning 

patient was sitting on the side of the bed w/o acute distress. Reports feeling 

better s/p blood transfusion with symptoms now improved. No active signs of 

bleeding such as hematemesis, melena, or hematochezia. Had prior N/V which has 

now resolved and recent wt loss. Denies abd pain, diarrhea, or constipation. 

Admits to NSAID use at home but no hx of PUD. No previous EGD/colonoscopy. No 

known Fhx of GI cancers. Abd CT w/o acute findings. 





Past History


Past Medical History: No medical history


Past Surgical History: No surgical history


Social history: smoking (x 15 years)


Family history: diabetes, hypertension





Medications and Allergies


                                    Allergies











Allergy/AdvReac Type Severity Reaction Status Date / Time


 


No Known Allergies Allergy   Unverified 03/22/16 16:13











                                Home Medications











 Medication  Instructions  Recorded  Confirmed  Last Taken  Type


 


Pantoprazole [Protonix TAB] 40 mg PO QDAY #30 tablet 02/10/20  Unknown Rx











Active Meds: 


Active Medications





Acetaminophen (Tylenol)  650 mg PO Q4H PRN


   PRN Reason: Pain MILD(1-3)/Fever >100.5/HA


Atorvastatin Calcium (Lipitor)  40 mg PO QHS [facility]


   Last Admin: 02/10/20 01:26 Dose:  40 mg


   Documented by: 


Famotidine (Pepcid)  20 mg IV BID [facility]


   Last Admin: 02/10/20 09:37 Dose:  20 mg


   Documented by: 


Hydralazine HCl (Apresoline)  10 mg IV Q4H PRN


   PRN Reason: Hypertension


Morphine Sulfate (Morphine)  2 mg IV Q4H PRN


   PRN Reason: Pain, Moderate (4-6)


Ondansetron HCl (Zofran)  4 mg IV Q8H PRN


   PRN Reason: Nausea And Vomiting


Polyethylene Glycol/Electrolytes (Golytely)  4,000 ml PO ONCE ONE


   Stop: 02/10/20 15:01





medications reviewed/updated as required





Review of Systems





- Review of Systems


All systems: negative


Constitutional: weight loss, weakness


Cardiovascular: chest pain


Gastrointestinal: nausea, vomiting, no abdominal pain, no hematemesis, no 

melena, no hematochezia


Neurological: other (headache)





Exam





- Constitutional


Vital Signs: 


                                        











Temp Pulse Resp BP Pulse Ox


 


 98.0 F   69   16   128/67   100 


 


 02/10/20 12:14  02/10/20 12:14  02/10/20 12:14  02/10/20 12:14  02/10/20 12:14











General appearance: no acute distress, other (thin appearing)





- EENT


Eyes: PERRL, EOM intact


ENT: hearing intact





- Respiratory


Respiratory effort: normal


Respiratory: bilateral: CTA





- Cardiovascular


Rhythm: regular





- Gastrointestinal


General gastrointestinal: Present: soft, non-tender, non-distended, normal bowel

sounds





- Integumentary


Integumentary: Present: warm, dry





- Neurologic


Neurological: alert and oriented x3





- Labs


CBC & Chem 7: 


                                 02/10/20 07:00





                                 02/10/20 07:00


Lab Results: 


                         Laboratory Results - last 24 hr











  02/09/20 02/09/20 02/09/20





  14:25 14:25 14:25


 


WBC  8.9  


 


RBC  1.34 L  


 


Hgb  4.5 L*  


 


Hct  13.0 L*  


 


MCV  97 H  


 


MCH  34 H  


 


MCHC  35 H  


 


RDW  33.9 H  


 


Plt Count  139 L  


 


Lymph % (Auto)   


 


Lymph #   


 


Add Manual Diff  Complete  


 


Total Counted  100  


 


Seg Neuts % (Manual)  55.0  


 


Band Neutrophils %  0  


 


Lymphocytes % (Manual)  43.0 H  


 


Reactive Lymphs % (Man)  0  


 


Monocytes % (Manual)  1.0  


 


Eosinophils % (Manual)  0  


 


Basophils % (Manual)  1.0  


 


Metamyelocytes %  0  


 


Myelocytes %  0  


 


Promyelocytes %  0  


 


Blast Cells %  0  


 


Nucleated RBC %  1.0 H  


 


Seg Neutrophils # Man  4.9  


 


Band Neutrophils #  0.0  


 


Lymphocytes # (Manual)  3.8  


 


Abs React Lymphs (Man)  0.0  


 


Monocytes # (Manual)  0.1  


 


Eosinophils # (Manual)  0.0  


 


Basophils # (Manual)  0.1  


 


Metamyelocytes #  0.0  


 


Myelocytes #  0.0  


 


Promyelocytes #  0.0  


 


Blast Cells #  0.0  


 


WBC Morphology  Not Reportable  


 


Hypersegmented Neuts  Not Reportable  


 


Hyposegmented Neuts  Not Reportable  


 


Hypogranular Neuts  Not Reportable  


 


Smudge Cells  Not Reportable  


 


Toxic Granulation  Not Reportable  


 


Toxic Vacuolation  Not Reportable  


 


Dohle Bodies  Not Reportable  


 


Pelger-Huet Anomaly  Not Reportable  


 


Estefania Rods  Not Reportable  


 


Platelet Estimate  Consistent w auto  


 


Clumped Platelets  Not Reportable  


 


Plt Clumps, EDTA  Not Reportable  


 


Large Platelets  Not Reportable  


 


Giant Platelets  Not Reportable  


 


Platelet Satelliting  Not Reportable  


 


Plt Morphology Comment  Not Reportable  


 


RBC Morphology  Not Reportable  


 


Dimorphic RBCs  Not Reportable  


 


Polychromasia  Few  


 


Hypochromasia  1+  


 


Poikilocytosis  Not Reportable  


 


Anisocytosis  Not Reportable  


 


Microcytosis  Not Reportable  


 


Macrocytosis  Not Reportable  


 


Spherocytes  Few  


 


Pappenheimer Bodies  Not Reportable  


 


Sickle Cells  Not Reportable  


 


Target Cells  1+  


 


Tear Drop Cells  1+  


 


Ovalocytes  Not Reportable  


 


Helmet Cells  Not Reportable  


 


Steen-Fishhook Bodies  Not Reportable  


 


Cabot Rings  Not Reportable  


 


Monica Cells  Not Reportable  


 


Bite Cells  Not Reportable  


 


Crenated Cell  Not Reportable  


 


Elliptocytes  1+  


 


Acanthocytes (Spur)  Not Reportable  


 


Rouleaux  Not Reportable  


 


Hemoglobin C Crystals  Not Reportable  


 


Schistocytes  Not Reportable  


 


Malaria parasites  Not Reportable  


 


Guille Bodies  Not Reportable  


 


Hem Pathologist Commnt  No  


 


PT   14.4 


 


INR   1.11 


 


APTT   22.6 L 


 


Sodium    140


 


Potassium    4.0


 


Chloride    99.9


 


Carbon Dioxide    25


 


Anion Gap    19


 


BUN    14


 


Creatinine    0.7 L


 


Estimated GFR    > 60


 


BUN/Creatinine Ratio    20


 


Glucose    110 H


 


Calcium    9.7


 


Phosphorus    4.40


 


Magnesium    1.80


 


Iron   


 


TIBC   


 


Ferritin   


 


Total Bilirubin    1.30 H


 


AST    76 H


 


ALT    38


 


Alkaline Phosphatase    62


 


Troponin T    < 0.010


 


Total Protein    7.4


 


Albumin    4.4


 


Albumin/Globulin Ratio    1.5


 


Lipase   


 


Vitamin B12   


 


Folate   


 


TSH   


 


HIV 1&2 Antibody Rapid   


 


HIV P24 Antigen   


 


Blood Type   


 


Antibody Screen   


 


Direct Antiglob Test   


 


FADUMO, Poly Interpret   


 


Crossmatch   














  02/09/20 02/09/20 02/09/20





  14:25 16:00 16:00


 


WBC    8.5


 


RBC    1.41 L


 


Hgb    4.7 L*


 


Hct    13.9 L*


 


MCV    98 H


 


MCH    33 H


 


MCHC    34


 


RDW    34.6 H


 


Plt Count    131 L


 


Lymph % (Auto)   


 


Lymph #   


 


Add Manual Diff    Complete


 


Total Counted    100


 


Seg Neuts % (Manual)    43.0


 


Band Neutrophils %    0


 


Lymphocytes % (Manual)    53.0 H


 


Reactive Lymphs % (Man)    0


 


Monocytes % (Manual)    3.0


 


Eosinophils % (Manual)    1.0


 


Basophils % (Manual)    0


 


Metamyelocytes %    0


 


Myelocytes %    0


 


Promyelocytes %    0


 


Blast Cells %    0


 


Nucleated RBC %    Not Reportable


 


Seg Neutrophils # Man    3.7


 


Band Neutrophils #    0.0


 


Lymphocytes # (Manual)    4.5


 


Abs React Lymphs (Man)    0.0


 


Monocytes # (Manual)    0.3


 


Eosinophils # (Manual)    0.1


 


Basophils # (Manual)    0.0


 


Metamyelocytes #    0.0


 


Myelocytes #    0.0


 


Promyelocytes #    0.0


 


Blast Cells #    0.0


 


WBC Morphology    Not Reportable


 


Hypersegmented Neuts    Not Reportable


 


Hyposegmented Neuts    Not Reportable


 


Hypogranular Neuts    Not Reportable


 


Smudge Cells    Not Reportable


 


Toxic Granulation    Not Reportable


 


Toxic Vacuolation    Not Reportable


 


Dohle Bodies    Not Reportable


 


Pelger-Huet Anomaly    Not Reportable


 


Estefania Rods    Not Reportable


 


Platelet Estimate    Not Reportable


 


Clumped Platelets    Not Reportable


 


Plt Clumps, EDTA    Not Reportable


 


Large Platelets    Not Reportable


 


Giant Platelets    Not Reportable


 


Platelet Satelliting    Not Reportable


 


Plt Morphology Comment    Not Reportable


 


RBC Morphology    Not Reportable


 


Dimorphic RBCs    Not Reportable


 


Polychromasia    1+


 


Hypochromasia    Not Reportable


 


Poikilocytosis    Not Reportable


 


Anisocytosis    1+


 


Microcytosis    Not Reportable


 


Macrocytosis    Not Reportable


 


Spherocytes    1+


 


Pappenheimer Bodies    Not Reportable


 


Sickle Cells    Not Reportable


 


Target Cells    1+


 


Tear Drop Cells    1+


 


Ovalocytes    Not Reportable


 


Helmet Cells    Not Reportable


 


Steen-Fishhook Bodies    Not Reportable


 


Cabot Rings    Not Reportable


 


Monica Cells    Not Reportable


 


Bite Cells    Not Reportable


 


Crenated Cell    Not Reportable


 


Elliptocytes    1+


 


Acanthocytes (Spur)    Not Reportable


 


Rouleaux    Not Reportable


 


Hemoglobin C Crystals    Not Reportable


 


Schistocytes    Not Reportable


 


Malaria parasites    Not Reportable


 


Guille Bodies    Not Reportable


 


Hem Pathologist Commnt    No


 


PT   


 


INR   


 


APTT   


 


Sodium   


 


Potassium   


 


Chloride   


 


Carbon Dioxide   


 


Anion Gap   


 


BUN   


 


Creatinine   


 


Estimated GFR   


 


BUN/Creatinine Ratio   


 


Glucose   


 


Calcium   


 


Phosphorus   


 


Magnesium   


 


Iron   


 


TIBC   


 


Ferritin   


 


Total Bilirubin   


 


AST   


 


ALT   


 


Alkaline Phosphatase   


 


Troponin T   < 0.010 


 


Total Protein   


 


Albumin   


 


Albumin/Globulin Ratio   


 


Lipase   


 


Vitamin B12   


 


Folate   


 


TSH  1.680  


 


HIV 1&2 Antibody Rapid   


 


HIV P24 Antigen   


 


Blood Type   


 


Antibody Screen   


 


Direct Antiglob Test   


 


FADUMO, Poly Interpret   


 


Crossmatch   














  02/09/20 02/09/20 02/09/20





  16:00 16:00 16:00


 


WBC   


 


RBC   


 


Hgb   


 


Hct   


 


MCV   


 


MCH   


 


MCHC   


 


RDW   


 


Plt Count   


 


Lymph % (Auto)   


 


Lymph #   


 


Add Manual Diff   


 


Total Counted   


 


Seg Neuts % (Manual)   


 


Band Neutrophils %   


 


Lymphocytes % (Manual)   


 


Reactive Lymphs % (Man)   


 


Monocytes % (Manual)   


 


Eosinophils % (Manual)   


 


Basophils % (Manual)   


 


Metamyelocytes %   


 


Myelocytes %   


 


Promyelocytes %   


 


Blast Cells %   


 


Nucleated RBC %   


 


Seg Neutrophils # Man   


 


Band Neutrophils #   


 


Lymphocytes # (Manual)   


 


Abs React Lymphs (Man)   


 


Monocytes # (Manual)   


 


Eosinophils # (Manual)   


 


Basophils # (Manual)   


 


Metamyelocytes #   


 


Myelocytes #   


 


Promyelocytes #   


 


Blast Cells #   


 


WBC Morphology   


 


Hypersegmented Neuts   


 


Hyposegmented Neuts   


 


Hypogranular Neuts   


 


Smudge Cells   


 


Toxic Granulation   


 


Toxic Vacuolation   


 


Dohle Bodies   


 


Pelger-Huet Anomaly   


 


Estefania Rods   


 


Platelet Estimate   


 


Clumped Platelets   


 


Plt Clumps, EDTA   


 


Large Platelets   


 


Giant Platelets   


 


Platelet Satelliting   


 


Plt Morphology Comment   


 


RBC Morphology   


 


Dimorphic RBCs   


 


Polychromasia   


 


Hypochromasia   


 


Poikilocytosis   


 


Anisocytosis   


 


Microcytosis   


 


Macrocytosis   


 


Spherocytes   


 


Pappenheimer Bodies   


 


Sickle Cells   


 


Target Cells   


 


Tear Drop Cells   


 


Ovalocytes   


 


Helmet Cells   


 


Steen-Fishhook Bodies   


 


Cabot Rings   


 


[place] Cells   


 


Bite Cells   


 


Crenated Cell   


 


Elliptocytes   


 


Acanthocytes (Spur)   


 


Rouleaux   


 


Hemoglobin C Crystals   


 


Schistocytes   


 


Malaria parasites   


 


Guille Bodies   


 


Hem Pathologist Commnt   


 


PT    13.9


 


INR    1.06


 


APTT    25.9


 


Sodium   


 


Potassium   


 


Chloride   


 


Carbon Dioxide   


 


Anion Gap   


 


BUN   


 


Creatinine   


 


Estimated GFR   


 


BUN/Creatinine Ratio   


 


Glucose   


 


Calcium   


 


Phosphorus   


 


Magnesium  1.90  


 


Iron   


 


TIBC   


 


Ferritin   


 


Total Bilirubin   


 


AST   


 


ALT   


 


Alkaline Phosphatase   


 


Troponin T   


 


Total Protein   


 


Albumin   


 


Albumin/Globulin Ratio   


 


Lipase  10 L  


 


Vitamin B12   


 


Folate   


 


TSH   


 


HIV 1&2 Antibody Rapid   


 


HIV P24 Antigen   


 


Blood Type   O POSITIVE 


 


Antibody Screen   Negative 


 


Direct Antiglob Test   


 


FADUMO, Poly Interpret   


 


Crossmatch   See Detail 














  02/09/20 02/09/20 02/09/20





  23:12 23:12 23:12


 


WBC   


 


RBC   


 


Hgb   


 


Hct   


 


MCV   


 


MCH   


 


MCHC   


 


RDW   


 


Plt Count   


 


Lymph % (Auto)   


 


Lymph #   


 


Add Manual Diff   


 


Total Counted   


 


Seg Neuts % (Manual)   


 


Band Neutrophils %   


 


Lymphocytes % (Manual)   


 


Reactive Lymphs % (Man)   


 


Monocytes % (Manual)   


 


Eosinophils % (Manual)   


 


Basophils % (Manual)   


 


Metamyelocytes %   


 


Myelocytes %   


 


Promyelocytes %   


 


Blast Cells %   


 


Nucleated RBC %   


 


Seg Neutrophils # Man   


 


Band Neutrophils #   


 


Lymphocytes # (Manual)   


 


Abs React Lymphs (Man)   


 


Monocytes # (Manual)   


 


Eosinophils # (Manual)   


 


Basophils # (Manual)   


 


Metamyelocytes #   


 


Myelocytes #   


 


Promyelocytes #   


 


Blast Cells #   


 


WBC Morphology   


 


Hypersegmented Neuts   


 


Hyposegmented Neuts   


 


Hypogranular Neuts   


 


Smudge Cells   


 


Toxic Granulation   


 


Toxic Vacuolation   


 


Dohle Bodies   


 


Pelger-Huet Anomaly   


 


Estefania Rods   


 


Platelet Estimate   


 


Clumped Platelets   


 


Plt Clumps, EDTA   


 


Large Platelets   


 


Giant Platelets   


 


Platelet Satelliting   


 


Plt Morphology Comment   


 


RBC Morphology   


 


Dimorphic RBCs   


 


Polychromasia   


 


Hypochromasia   


 


Poikilocytosis   


 


Anisocytosis   


 


Microcytosis   


 


Macrocytosis   


 


Spherocytes   


 


Pappenheimer Bodies   


 


Sickle Cells   


 


Target Cells   


 


Tear Drop Cells   


 


Ovalocytes   


 


Helmet Cells   


 


Steen-Fishhook Bodies   


 


Cabot Rings   


 


Monica Cells   


 


Bite Cells   


 


Crenated Cell   


 


Elliptocytes   


 


Acanthocytes (Spur)   


 


Rouleaux   


 


Hemoglobin C Crystals   


 


Schistocytes   


 


Malaria parasites   


 


Guille Bodies   


 


Hem Pathologist Commnt   


 


PT   


 


INR   


 


APTT   


 


Sodium   


 


Potassium   


 


Chloride   


 


Carbon Dioxide   


 


Anion Gap   


 


BUN   


 


Creatinine   


 


Estimated GFR   


 


BUN/Creatinine Ratio   


 


Glucose   


 


Calcium   


 


Phosphorus   


 


Magnesium   


 


Iron  151  


 


TIBC  128 L  


 


Ferritin   448.1 H 


 


Total Bilirubin   


 


AST   


 


ALT   


 


Alkaline Phosphatase   


 


Troponin T   


 


Total Protein   


 


Albumin   


 


Albumin/Globulin Ratio   


 


Lipase   


 


Vitamin B12   


 


Folate   


 


TSH   


 


HIV 1&2 Antibody Rapid    Non react


 


HIV P24 Antigen    Non react


 


Blood Type   


 


Antibody Screen   


 


Direct Antiglob Test   


 


FADUMO, Poly Interpret   


 


Crossmatch   














  02/10/20 02/10/20 02/10/20





  00:56 07:00 07:00


 


WBC   8.1 


 


RBC   2.30 L 


 


Hgb   7.8 L D 


 


Hct   22.2 L D 


 


MCV   96 H 


 


MCH   34 H 


 


MCHC   35 H 


 


RDW   17.6 H 


 


Plt Count   104 L 


 


Lymph % (Auto)   Np 


 


Lymph #   Np 


 


Add Manual Diff   Complete 


 


Total Counted   100 


 


Seg Neuts % (Manual)   36.0 L 


 


Band Neutrophils %   0 


 


Lymphocytes % (Manual)   61.0 H 


 


Reactive Lymphs % (Man)   0 


 


Monocytes % (Manual)   1.0 


 


Eosinophils % (Manual)   1.0 


 


Basophils % (Manual)   0 


 


Metamyelocytes %   1.0 


 


Myelocytes %   0 


 


Promyelocytes %   0 


 


Blast Cells %   0 


 


Nucleated RBC %   1.0 H 


 


Seg Neutrophils # Man   2.9 


 


Band Neutrophils #   0.0 


 


Lymphocytes # (Manual)   4.9 


 


Abs React Lymphs (Man)   0.0 


 


Monocytes # (Manual)   0.1 


 


Eosinophils # (Manual)   0.1 


 


Basophils # (Manual)   0.0 


 


Metamyelocytes #   0.1 


 


Myelocytes #   0.0 


 


Promyelocytes #   0.0 


 


Blast Cells #   0.0 


 


WBC Morphology   Not Reportable 


 


Hypersegmented Neuts   Not Reportable 


 


Hyposegmented Neuts   Not Reportable 


 


Hypogranular Neuts   Not Reportable 


 


Smudge Cells   Not Reportable 


 


Toxic Granulation   Not Reportable 


 


Toxic Vacuolation   Not Reportable 


 


Dohle Bodies   Not Reportable 


 


Pelger-Huet Anomaly   Not Reportable 


 


Estefania Rods   Not Reportable 


 


Platelet Estimate   Consistent w auto 


 


Clumped Platelets   Not Reportable 


 


Plt Clumps, EDTA   Not Reportable 


 


Large Platelets   Not Reportable 


 


Giant Platelets   Not Reportable 


 


Platelet Satelliting   Not Reportable 


 


Plt Morphology Comment   Not Reportable 


 


RBC Morphology   Not Reportable 


 


Dimorphic RBCs   Not Reportable 


 


Polychromasia   Few 


 


Hypochromasia   Not Reportable 


 


Poikilocytosis   1+ 


 


Anisocytosis   1+ 


 


Microcytosis   Not Reportable 


 


Macrocytosis   1+ 


 


Spherocytes   Not Reportable 


 


Pappenheimer Bodies   Not Reportable 


 


Sickle Cells   Not Reportable 


 


Target Cells   Few 


 


Tear Drop Cells   Few 


 


Ovalocytes   1+ 


 


Helmet Cells   Not Reportable 


 


Steen-Fishhook Bodies   Not Reportable 


 


Cabot Rings   Not Reportable 


 


Monica Cells   Not Reportable 


 


Bite Cells   Not Reportable 


 


Crenated Cell   Not Reportable 


 


Elliptocytes   Not Reportable 


 


Acanthocytes (Spur)   Not Reportable 


 


Rouleaux   Not Reportable 


 


Hemoglobin C Crystals   Not Reportable 


 


Schistocytes   Not Reportable 


 


Malaria parasites   Not Reportable 


 


Guille Bodies   Not Reportable 


 


Hem Pathologist Commnt   No 


 


PT   


 


INR   


 


APTT   


 


Sodium    139


 


Potassium    5.1 H D


 


Chloride    103.6


 


Carbon Dioxide    22


 


Anion Gap    19


 


BUN    11


 


Creatinine    0.8


 


Estimated GFR    > 60


 


BUN/Creatinine Ratio    14


 


Glucose    103 H


 


Calcium    9.1


 


Phosphorus   


 


Magnesium   


 


Iron   


 


TIBC   


 


Ferritin   


 


Total Bilirubin   


 


AST   


 


ALT   


 


Alkaline Phosphatase   


 


Troponin T  < 0.010  


 


Total Protein   


 


Albumin   


 


Albumin/Globulin Ratio   


 


Lipase   


 


Vitamin B12   


 


Folate   


 


TSH   


 


HIV 1&2 Antibody Rapid   


 


HIV P24 Antigen   


 


Blood Type   


 


Antibody Screen   


 


Direct Antiglob Test   


 


FADUMO, Poly Interpret   


 


Crossmatch   














  02/10/20 02/10/20 02/10/20





  07:28 07:28 09:15


 


WBC   


 


RBC   


 


Hgb   


 


Hct   


 


MCV   


 


MCH   


 


MCHC   


 


RDW   


 


Plt Count   


 


Lymph % (Auto)   


 


Lymph #   


 


Add Manual Diff   


 


Total Counted   


 


Seg Neuts % (Manual)   


 


Band Neutrophils %   


 


Lymphocytes % (Manual)   


 


Reactive Lymphs % (Man)   


 


Monocytes % (Manual)   


 


Eosinophils % (Manual)   


 


Basophils % (Manual)   


 


Metamyelocytes %   


 


Myelocytes %   


 


Promyelocytes %   


 


Blast Cells %   


 


Nucleated RBC %   


 


Seg Neutrophils # Man   


 


Band Neutrophils #   


 


Lymphocytes # (Manual)   


 


Abs React Lymphs (Man)   


 


Monocytes # (Manual)   


 


Eosinophils # (Manual)   


 


Basophils # (Manual)   


 


Metamyelocytes #   


 


Myelocytes #   


 


Promyelocytes #   


 


Blast Cells #   


 


WBC Morphology   


 


Hypersegmented Neuts   


 


Hyposegmented Neuts   


 


Hypogranular Neuts   


 


Smudge Cells   


 


Toxic Granulation   


 


Toxic Vacuolation   


 


Dohle Bodies   


 


Pelger-Huet Anomaly   


 


Estefania Rods   


 


Platelet Estimate   


 


Clumped Platelets   


 


Plt Clumps, EDTA   


 


Large Platelets   


 


Giant Platelets   


 


Platelet Satelliting   


 


Plt Morphology Comment   


 


RBC Morphology   


 


Dimorphic RBCs   


 


Polychromasia   


 


Hypochromasia   


 


Poikilocytosis   


 


Anisocytosis   


 


Microcytosis   


 


Macrocytosis   


 


Spherocytes   


 


Pappenheimer Bodies   


 


Sickle Cells   


 


Target Cells   


 


Tear Drop Cells   


 


Ovalocytes   


 


Helmet Cells   


 


Steen-Fishhook Bodies   


 


Cabot Rings   


 


Monica Cells   


 


Bite Cells   


 


Crenated Cell   


 


Elliptocytes   


 


Acanthocytes (Spur)   


 


Rouleaux   


 


Hemoglobin C Crystals   


 


Schistocytes   


 


Malaria parasites   


 


Guille Bodies   


 


Hem Pathologist Commnt   


 


PT   


 


INR   


 


APTT   


 


Sodium   


 


Potassium   


 


Chloride   


 


Carbon Dioxide   


 


Anion Gap   


 


BUN   


 


Creatinine   


 


Estimated GFR   


 


BUN/Creatinine Ratio   


 


Glucose   


 


Calcium   


 


Phosphorus   


 


Magnesium   


 


Iron   


 


TIBC   


 


Ferritin   


 


Total Bilirubin   


 


AST   


 


ALT   


 


Alkaline Phosphatase   


 


Troponin T   


 


Total Protein   


 


Albumin   


 


Albumin/Globulin Ratio   


 


Lipase   


 


Vitamin B12  150.0 L  


 


Folate   5.89 L 


 


TSH   


 


HIV 1&2 Antibody Rapid   


 


HIV P24 Antigen   


 


Blood Type   


 


Antibody Screen   


 


Direct Antiglob Test    Negative


 


FADUMO, Poly Interpret    Negative


 


Crossmatch   














Assessment and Plan


1.anemia





-H/H 7.8/22.2 s/p blood transfusion (4.5/13.0 on admission); continue to monitor

H/H and transfuse as needed


-MCV elevated, plt low (104), INR WNL


-iron WNL (151), TIBC 128, ferritin 448


-B12 150 and folate 5.89


-stool occult negative


-abd CT w/o acute findings


-etiology unclear- hemolytic process vs other


-clinically, patient is stable with no active signs of bleeding. Denies current 

abd pain or N/V. 


-hematology following


-will schedule for EGD/colonoscopy tomorrow for further evaluation (r/o GI 

pathology/malignancy)


-okay for clear liquids today, then NPO after MN


-avoid NSAIDs


-daily PPI


-continue supportive care


-will follow





<MITCHEL MATIAS - Last Filed: 02/10/20 16:37>





Medications and Allergies


Active Meds: 


Active Medications





Acetaminophen (Tylenol)  650 mg PO Q4H PRN


   PRN Reason: Pain MILD(1-3)/Fever >100.5/HA


Atorvastatin Calcium (Lipitor)  40 mg PO QHS [facility]


   Last Admin: 02/10/20 01:26 Dose:  40 mg


   Documented by: 


Famotidine (Pepcid)  20 mg IV BID [facility]


   Last Admin: 02/10/20 09:37 Dose:  20 mg


   Documented by: 


Hydralazine HCl (Apresoline)  10 mg IV Q4H PRN


   PRN Reason: Hypertension


Morphine Sulfate (Morphine)  2 mg IV Q4H PRN


   PRN Reason: Pain, Moderate (4-6)


Ondansetron HCl (Zofran)  4 mg IV Q8H PRN


   PRN Reason: Nausea And Vomiting











Exam





- Constitutional


Vital Signs: 


                                        











Temp Pulse Resp BP Pulse Ox


 


 98.0 F   69   16   128/67   100 


 


 02/10/20 12:14  02/10/20 12:14  02/10/20 12:14  02/10/20 12:14  02/10/20 12:14














- Labs


CBC & Chem 7: 


                                 02/10/20 07:00





                                 02/10/20 07:00


Lab Results: 


                         Laboratory Results - last 24 hr











  02/09/20 02/09/20 02/09/20





  16:00 16:00 16:00


 


WBC   8.5 


 


RBC   1.41 L 


 


Hgb   4.7 L* 


 


Hct   13.9 L* 


 


MCV   98 H 


 


MCH   33 H 


 


MCHC   34 


 


RDW   34.6 H 


 


Plt Count   131 L 


 


Lymph % (Auto)   


 


Lymph #   


 


Add Manual Diff   Complete 


 


Total Counted   100 


 


Seg Neuts % (Manual)   43.0 


 


Band Neutrophils %   0 


 


Lymphocytes % (Manual)   53.0 H 


 


Reactive Lymphs % (Man)   0 


 


Monocytes % (Manual)   3.0 


 


Eosinophils % (Manual)   1.0 


 


Basophils % (Manual)   0 


 


Metamyelocytes %   0 


 


Myelocytes %   0 


 


Promyelocytes %   0 


 


Blast Cells %   0 


 


Nucleated RBC %   Not Reportable 


 


Seg Neutrophils # Man   3.7 


 


Band Neutrophils #   0.0 


 


Lymphocytes # (Manual)   4.5 


 


Abs React Lymphs (Man)   0.0 


 


Monocytes # (Manual)   0.3 


 


Eosinophils # (Manual)   0.1 


 


Basophils # (Manual)   0.0 


 


Metamyelocytes #   0.0 


 


Myelocytes #   0.0 


 


Promyelocytes #   0.0 


 


Blast Cells #   0.0 


 


WBC Morphology   Not Reportable 


 


Hypersegmented Neuts   Not Reportable 


 


Hyposegmented Neuts   Not Reportable 


 


Hypogranular Neuts   Not Reportable 


 


Smudge Cells   Not Reportable 


 


Toxic Granulation   Not Reportable 


 


Toxic Vacuolation   Not Reportable 


 


Dohle Bodies   Not Reportable 


 


Pelger-Huet Anomaly   Not Reportable 


 


Estefania Rods   Not Reportable 


 


Platelet Estimate   Not Reportable 


 


Clumped Platelets   Not Reportable 


 


Plt Clumps, EDTA   Not Reportable 


 


Large Platelets   Not Reportable 


 


Giant Platelets   Not Reportable 


 


Platelet Satelliting   Not Reportable 


 


Plt Morphology Comment   Not Reportable 


 


RBC Morphology   Not Reportable 


 


Dimorphic RBCs   Not Reportable 


 


Polychromasia   1+ 


 


Hypochromasia   Not Reportable 


 


Poikilocytosis   Not Reportable 


 


Anisocytosis   1+ 


 


Microcytosis   Not Reportable 


 


Macrocytosis   Not Reportable 


 


Spherocytes   1+ 


 


Pappenheimer Bodies   Not Reportable 


 


Sickle Cells   Not Reportable 


 


Target Cells   1+ 


 


Tear Drop Cells   1+ 


 


Ovalocytes   Not Reportable 


 


Helmet Cells   Not Reportable 


 


Steen-Fishhook Bodies   Not Reportable 


 


Cabot Rings   Not Reportable 


 


Macomb Cells   Not Reportable 


 


Bite Cells   Not Reportable 


 


Crenated Cell   Not Reportable 


 


Elliptocytes   1+ 


 


Acanthocytes (Spur)   Not Reportable 


 


Rouleaux   Not Reportable 


 


Hemoglobin C Crystals   Not Reportable 


 


Schistocytes   Not Reportable 


 


Malaria parasites   Not Reportable 


 


Guille Bodies   Not Reportable 


 


Hem Pathologist Commnt   No 


 


PT   


 


INR   


 


APTT   


 


Sodium   


 


Potassium   


 


Chloride   


 


Carbon Dioxide   


 


Anion Gap   


 


BUN   


 


Creatinine   


 


Estimated GFR   


 


BUN/Creatinine Ratio   


 


Glucose   


 


Calcium   


 


Magnesium    1.90


 


Iron   


 


TIBC   


 


Ferritin   


 


Troponin T  < 0.010  


 


Lipase    10 L


 


Vitamin B12   


 


Folate   


 


HIV 1&2 Antibody Rapid   


 


HIV P24 Antigen   


 


Blood Type   


 


Antibody Screen   


 


Direct Antiglob Test   


 


FADUMO, Poly Interpret   


 


Crossmatch   














  02/09/20 02/09/20 02/09/20





  16:00 16:00 23:12


 


WBC   


 


RBC   


 


Hgb   


 


Hct   


 


MCV   


 


MCH   


 


MCHC   


 


RDW   


 


Plt Count   


 


Lymph % (Auto)   


 


Lymph #   


 


Add Manual Diff   


 


Total Counted   


 


Seg Neuts % (Manual)   


 


Band Neutrophils %   


 


Lymphocytes % (Manual)   


 


Reactive Lymphs % (Man)   


 


Monocytes % (Manual)   


 


Eosinophils % (Manual)   


 


Basophils % (Manual)   


 


Metamyelocytes %   


 


Myelocytes %   


 


Promyelocytes %   


 


Blast Cells %   


 


Nucleated RBC %   


 


Seg Neutrophils # Man   


 


Band Neutrophils #   


 


Lymphocytes # (Manual)   


 


Abs React Lymphs (Man)   


 


Monocytes # (Manual)   


 


Eosinophils # (Manual)   


 


Basophils # (Manual)   


 


Metamyelocytes #   


 


Myelocytes #   


 


Promyelocytes #   


 


Blast Cells #   


 


WBC Morphology   


 


Hypersegmented Neuts   


 


Hyposegmented Neuts   


 


Hypogranular Neuts   


 


Smudge Cells   


 


Toxic Granulation   


 


Toxic Vacuolation   


 


Dohle Bodies   


 


Pelger-Huet Anomaly   


 


Estefania Rods   


 


Platelet Estimate   


 


Clumped Platelets   


 


Plt Clumps, EDTA   


 


Large Platelets   


 


Giant Platelets   


 


Platelet Satelliting   


 


Plt Morphology Comment   


 


RBC Morphology   


 


Dimorphic RBCs   


 


Polychromasia   


 


Hypochromasia   


 


Poikilocytosis   


 


Anisocytosis   


 


Microcytosis   


 


Macrocytosis   


 


Spherocytes   


 


Pappenheimer Bodies   


 


Sickle Cells   


 


Target Cells   


 


Tear Drop Cells   


 


Ovalocytes   


 


Helmet Cells   


 


Steen-Fishhook Bodies   


 


Cabot Rings   


 


Monica Cells   


 


Bite Cells   


 


Crenated Cell   


 


Elliptocytes   


 


Acanthocytes (Spur)   


 


Rouleaux   


 


Hemoglobin C Crystals   


 


Schistocytes   


 


Malaria parasites   


 


Giulle Bodies   


 


Hem Pathologist Commnt   


 


PT   13.9 


 


INR   1.06 


 


APTT   25.9 


 


Sodium   


 


Potassium   


 


Chloride   


 


Carbon Dioxide   


 


Anion Gap   


 


BUN   


 


Creatinine   


 


Estimated GFR   


 


BUN/Creatinine Ratio   


 


Glucose   


 


Calcium   


 


Magnesium   


 


Iron    151


 


TIBC    128 L


 


Ferritin   


 


Troponin T   


 


Lipase   


 


Vitamin B12   


 


Folate   


 


HIV 1&2 Antibody Rapid   


 


HIV P24 Antigen   


 


Blood Type  O POSITIVE  


 


Antibody Screen  Negative  


 


Direct Antiglob Test   


 


FADUMO, Poly Interpret   


 


Crossmatch  See Detail  














  02/09/20 02/09/20 02/10/20





  23:12 23:12 00:56


 


WBC   


 


RBC   


 


Hgb   


 


Hct   


 


MCV   


 


MCH   


 


MCHC   


 


RDW   


 


Plt Count   


 


Lymph % (Auto)   


 


Lymph #   


 


Add Manual Diff   


 


Total Counted   


 


Seg Neuts % (Manual)   


 


Band Neutrophils %   


 


Lymphocytes % (Manual)   


 


Reactive Lymphs % (Man)   


 


Monocytes % (Manual)   


 


Eosinophils % (Manual)   


 


Basophils % (Manual)   


 


Metamyelocytes %   


 


Myelocytes %   


 


Promyelocytes %   


 


Blast Cells %   


 


Nucleated RBC %   


 


Seg Neutrophils # Man   


 


Band Neutrophils #   


 


Lymphocytes # (Manual)   


 


Abs React Lymphs (Man)   


 


Monocytes # (Manual)   


 


Eosinophils # (Manual)   


 


Basophils # (Manual)   


 


Metamyelocytes #   


 


Myelocytes #   


 


Promyelocytes #   


 


Blast Cells #   


 


WBC Morphology   


 


Hypersegmented Neuts   


 


Hyposegmented Neuts   


 


Hypogranular Neuts   


 


Smudge Cells   


 


Toxic Granulation   


 


Toxic Vacuolation   


 


Dohle Bodies   


 


Pelger-Huet Anomaly   


 


Estefania Rods   


 


Platelet Estimate   


 


Clumped Platelets   


 


Plt Clumps, EDTA   


 


Large Platelets   


 


Giant Platelets   


 


Platelet Satelliting   


 


Plt Morphology Comment   


 


RBC Morphology   


 


Dimorphic RBCs   


 


Polychromasia   


 


Hypochromasia   


 


Poikilocytosis   


 


Anisocytosis   


 


Microcytosis   


 


Macrocytosis   


 


Spherocytes   


 


Pappenheimer Bodies   


 


Sickle Cells   


 


Target Cells   


 


Tear Drop Cells   


 


Ovalocytes   


 


Helmet Cells   


 


Steen-Fishhook Bodies   


 


Cabot Rings   


 


Monica Cells   


 


Bite Cells   


 


Crenated Cell   


 


Elliptocytes   


 


Acanthocytes (Spur)   


 


Rouleaux   


 


Hemoglobin C Crystals   


 


Schistocytes   


 


Malaria parasites   


 


Guille Bodies   


 


Hem Pathologist Commnt   


 


PT   


 


INR   


 


APTT   


 


Sodium   


 


Potassium   


 


Chloride   


 


Carbon Dioxide   


 


Anion Gap   


 


BUN   


 


Creatinine   


 


Estimated GFR   


 


BUN/Creatinine Ratio   


 


Glucose   


 


Calcium   


 


Magnesium   


 


Iron   


 


TIBC   


 


Ferritin  448.1 H  


 


Troponin T    < 0.010


 


Lipase   


 


Vitamin B12   


 


Folate   


 


HIV 1&2 Antibody Rapid   Non react 


 


HIV P24 Antigen   Non react 


 


Blood Type   


 


Antibody Screen   


 


Direct Antiglob Test   


 


FADUMO, Poly Interpret   


 


Crossmatch   














  02/10/20 02/10/20 02/10/20





  07:00 07:00 07:28


 


WBC  8.1  


 


RBC  2.30 L  


 


Hgb  7.8 L D  


 


Hct  22.2 L D  


 


MCV  96 H  


 


MCH  34 H  


 


MCHC  35 H  


 


RDW  17.6 H  


 


Plt Count  104 L  


 


Lymph % (Auto)  Np  


 


Lymph #  Np  


 


Add Manual Diff  Complete  


 


Total Counted  100  


 


Seg Neuts % (Manual)  36.0 L  


 


Band Neutrophils %  0  


 


Lymphocytes % (Manual)  61.0 H  


 


Reactive Lymphs % (Man)  0  


 


Monocytes % (Manual)  1.0  


 


Eosinophils % (Manual)  1.0  


 


Basophils % (Manual)  0  


 


Metamyelocytes %  1.0  


 


Myelocytes %  0  


 


Promyelocytes %  0  


 


Blast Cells %  0  


 


Nucleated RBC %  1.0 H  


 


Seg Neutrophils # Man  2.9  


 


Band Neutrophils #  0.0  


 


Lymphocytes # (Manual)  4.9  


 


Abs React Lymphs (Man)  0.0  


 


Monocytes # (Manual)  0.1  


 


Eosinophils # (Manual)  0.1  


 


Basophils # (Manual)  0.0  


 


Metamyelocytes #  0.1  


 


Myelocytes #  0.0  


 


Promyelocytes #  0.0  


 


Blast Cells #  0.0  


 


WBC Morphology  Not Reportable  


 


Hypersegmented Neuts  Not Reportable  


 


Hyposegmented Neuts  Not Reportable  


 


Hypogranular Neuts  Not Reportable  


 


Smudge Cells  Not Reportable  


 


Toxic Granulation  Not Reportable  


 


Toxic Vacuolation  Not Reportable  


 


Dohle Bodies  Not Reportable  


 


Pelger-Huet Anomaly  Not Reportable  


 


Estefania Rods  Not Reportable  


 


Platelet Estimate  Consistent w auto  


 


Clumped Platelets  Not Reportable  


 


Plt Clumps, EDTA  Not Reportable  


 


Large Platelets  Not Reportable  


 


Giant Platelets  Not Reportable  


 


Platelet Satelliting  Not Reportable  


 


Plt Morphology Comment  Not Reportable  


 


RBC Morphology  Not Reportable  


 


Dimorphic RBCs  Not Reportable  


 


Polychromasia  Few  


 


Hypochromasia  Not Reportable  


 


Poikilocytosis  1+  


 


Anisocytosis  1+  


 


Microcytosis  Not Reportable  


 


Macrocytosis  1+  


 


Spherocytes  Not Reportable  


 


Pappenheimer Bodies  Not Reportable  


 


Sickle Cells  Not Reportable  


 


Target Cells  Few  


 


Tear Drop Cells  Few  


 


Ovalocytes  1+  


 


Helmet Cells  Not Reportable  


 


Steen-Fishhook Bodies  Not Reportable  


 


Cabot Rings  Not Reportable  


 


Macomb Cells  Not Reportable  


 


Bite Cells  Not Reportable  


 


Crenated Cell  Not Reportable  


 


Elliptocytes  Not Reportable  


 


Acanthocytes (Spur)  Not Reportable  


 


Rouleaux  Not Reportable  


 


Hemoglobin C Crystals  Not Reportable  


 


Schistocytes  Not Reportable  


 


Malaria parasites  Not Reportable  


 


Guille Bodies  Not Reportable  


 


Hem Pathologist Commnt  No  


 


PT   


 


INR   


 


APTT   


 


Sodium   139 


 


Potassium   5.1 H D 


 


Chloride   103.6 


 


Carbon Dioxide   22 


 


Anion Gap   19 


 


BUN   11 


 


Creatinine   0.8 


 


Estimated GFR   > 60 


 


BUN/Creatinine Ratio   14 


 


Glucose   103 H 


 


Calcium   9.1 


 


Magnesium   


 


Iron   


 


TIBC   


 


Ferritin   


 


Troponin T   


 


Lipase   


 


Vitamin B12    150.0 L


 


Folate   


 


HIV 1&2 Antibody Rapid   


 


HIV P24 Antigen   


 


Blood Type   


 


Antibody Screen   


 


Direct Antiglob Test   


 


FADUMO, Poly Interpret   


 


Crossmatch   














  02/10/20 02/10/20





  07:28 09:15


 


WBC  


 


RBC  


 


Hgb  


 


Hct  


 


MCV  


 


MCH  


 


MCHC  


 


RDW  


 


Plt Count  


 


Lymph % (Auto)  


 


Lymph #  


 


Add Manual Diff  


 


Total Counted  


 


Seg Neuts % (Manual)  


 


Band Neutrophils %  


 


Lymphocytes % (Manual)  


 


Reactive Lymphs % (Man)  


 


Monocytes % (Manual)  


 


Eosinophils % (Manual)  


 


Basophils % (Manual)  


 


Metamyelocytes %  


 


Myelocytes %  


 


Promyelocytes %  


 


Blast Cells %  


 


Nucleated RBC %  


 


Seg Neutrophils # Man  


 


Band Neutrophils #  


 


Lymphocytes # (Manual)  


 


Abs React Lymphs (Man)  


 


Monocytes # (Manual)  


 


Eosinophils # (Manual)  


 


Basophils # (Manual)  


 


Metamyelocytes #  


 


Myelocytes #  


 


Promyelocytes #  


 


Blast Cells #  


 


WBC Morphology  


 


Hypersegmented Neuts  


 


Hyposegmented Neuts  


 


Hypogranular Neuts  


 


Smudge Cells  


 


Toxic Granulation  


 


Toxic Vacuolation  


 


Dohle Bodies  


 


Pelger-Huet Anomaly  


 


Estefania Rods  


 


Platelet Estimate  


 


Clumped Platelets  


 


Plt Clumps, EDTA  


 


Large Platelets  


 


Giant Platelets  


 


Platelet Satelliting  


 


Plt Morphology Comment  


 


RBC Morphology  


 


Dimorphic RBCs  


 


Polychromasia  


 


Hypochromasia  


 


Poikilocytosis  


 


Anisocytosis  


 


Microcytosis  


 


Macrocytosis  


 


Spherocytes  


 


Pappenheimer Bodies  


 


Sickle Cells  


 


Target Cells  


 


Tear Drop Cells  


 


Ovalocytes  


 


Helmet Cells  


 


Steen-Fishhook Bodies  


 


Cabot Rings  


 


Macomb Cells  


 


Bite Cells  


 


Crenated Cell  


 


Elliptocytes  


 


Acanthocytes (Spur)  


 


Rouleaux  


 


Hemoglobin C Crystals  


 


Schistocytes  


 


Malaria parasites  


 


Guille Bodies  


 


Hem Pathologist Commnt  


 


PT  


 


INR  


 


APTT  


 


Sodium  


 


Potassium  


 


Chloride  


 


Carbon Dioxide  


 


Anion Gap  


 


BUN  


 


Creatinine  


 


Estimated GFR  


 


BUN/Creatinine Ratio  


 


Glucose  


 


Calcium  


 


Magnesium  


 


Iron  


 


TIBC  


 


Ferritin  


 


Troponin T  


 


Lipase  


 


Vitamin B12  


 


Folate  5.89 L 


 


HIV 1&2 Antibody Rapid  


 


HIV P24 Antigen  


 


Blood Type  


 


Antibody Screen  


 


Direct Antiglob Test   Negative


 


FADUMO, Poly Interpret   Negative


 


Crossmatch  














Assessment and Plan


Patient seen and examined; agree with note above.  presenting with severe 

anemia, denies overt gi bleeding or significant gi complaints.  will plan 

egd/colonoscopy tomorrow to r/o possible gi source of anemia.

## 2020-02-10 NOTE — DISCHARGE SUMMARY
Providers





- Providers


Date of Admission: 


02/10/20 10:00





Attending physician: 


LIZ MEZA MD





                                        





02/09/20 22:21


Consult to Physician [CONS] Routine 


   Comment: 


   Consulting Provider: DYLAN ABREU


   Physician Instructions: 


   Reason For Exam: anemia





02/10/20 07:57


Consult to Physician [CONS] Routine 


   Comment: 


   Consulting Provider: MITCHEL MATIAS


   Physician Instructions: 


   Reason For Exam: SEVERE ANEMIA,











Primary care physician: 


PRIMARY CARE MD








Hospitalization


Reason for admission: GI bleed


Condition: Stable


Hospital course: 


Patient is a 51 y/o male with PMH of tobacco dependency who presented to ED with

 c/o headache, lightheadedness, weakness, and CP with symptoms exacerbated with 

physical activity. Upon admission, he was found to be severely anemic with H/H 

4.5/13.0 to which he was admitted and GI has been consulted. This morning 

patient was sitting on the side of the bed w/o acute distress. Reports feeling 

better s/p blood transfusion with symptoms now improved. No active signs of 

bleeding such as hematemesis, melena, or hematochezia. Had prior N/V which has 

now resolved and recent wt loss. Denies abd pain, diarrhea, or constipation. 

Admits to NSAID use at home but no hx of PUD. No previous EGD/colonoscopy. No 

known Fhx of GI cancers. Abd CT w/o acute findings. 


* Patient received 2 unit packed red blood cells with improvement in hemoglobin.

    I did advise the patient that is beneficial to have a GI evaluation the 

  patient declined and states that he has child support to pay and has to leave.


* Advised patient to avoid NSAIDs use at this time.


* Placed patient on PPI


* We recommend outpatient PPI continuation and also to follow-up with GI 

  hematology.


* Further chest pain at this time patient tolerating diet





Severe Anemia Unknown Source


-Hb: 4.7g/dl 


-3 units transfused in the ER 


-Monitor labs


-Heme consult


-GI consult





Headache


-x 2 months


-Accompanied c/ dizziness


-CT head negative


-PRN pain med


-Advised to stop Excedrin 





Nausea/ vomiting


-Anti-emetic prn





Hyperkalemia


-Monitor. May need kayxalate.





Severe Protein Calorie Malnutrition/Weightloss- Unkown etiology


Dietician consult.





Atypical Chest pain Could be secondary to demand and costochondritis


-x 3 weeks





Disposition: DC-01 TO HOME OR SELFCARE


Time spent for discharge: 35 minutes





Core Measure Documentation





- Palliative Care


Palliative Care/ Comfort Measures: Not Applicable





- Core Measures


Any of the following diagnoses?: none





Exam





- Physical Exam


Narrative exam: 


VITAL SIGNS:  Reviewed.    


GENERAL:  The patient appears normally developed, cachectic appearing.  Vital 

signs as documented.


HEAD:  No signs of head trauma.


EYES:  Pupils are equal.  Extraocular motions intact.  


EARS:  Hearing grossly intact.


MOUTH:  Oropharynx is normal. 


NECK:  No adenopathy, no JVD.  


CHEST:  Chest with clear breath sounds bilaterally.  No wheezes, rales, or 

rhonchi.  


CARDIAC:  Regular rate and rhythm.  S1 and S2, without murmurs, gallops, or 

rubs.


VASCULAR:  No Edema.  Peripheral pulses normal and equal in all extremities.


ABDOMEN:  Soft, non tender and non distended.  No   rebound or guarding, and no 

masses palpated.   Bowel Sounds normal.


MUSCULOSKELETAL:  Good range of motion of all major joints. Extremities without 

clubbing, cyanosis or edema.  


NEUROLOGIC EXAM:  Alert and oriented x 3   No focal sensory or strength 

deficits.   Speech normal.  Follows commands.


PSYCHIATRIC:  Mood normal.


SKIN:  detial exam as documented in skin assessment








- Constitutional


Vitals: 


                                        











Temp Pulse Resp BP Pulse Ox


 


 98.0 F   69   16   128/67   100 


 


 02/10/20 12:14  02/10/20 12:14  02/10/20 12:14  02/10/20 12:14  02/10/20 12:14














Plan


Activity: advance as tolerated, fall precautions


Diet: low fat


Special Instructions: record daily weights, record daily BP diary


Additional Instructions: Must avoid NSAIDs


Follow up with: 


PRIMARY CARE,MD [Primary Care Provider] - 3-5 Days


MITCHEL MATIAS MD [Staff Physician] - 7 Days


DYLAN ABREU MD [Staff Physician] - 7 Days


Prescriptions: 


Pantoprazole [Protonix TAB] 40 mg PO QDAY #30 tablet

## 2020-02-10 NOTE — PROGRESS NOTE
Assessment and Plan


Assessment and plan: 


50-year-old [place] male without any known past medical or surgical history 

presents to the hospital complaining of lightheadedness, vomiting, and pain.  

Patient states he's had a global headache daily since December.   The last 3 

weeks he's had intermittent vomiting primarily with meals but is able to t

olerate liquids.  Patient endorses weight loss during this period.  He also has 

any shortness of breath, chest pain, or weakness, focal numbness, blurry vision,

and abdominal cramps.  Patient denies fever, melena, hematochezia, hematemesis, 

alcohol abuse, or recent travel.  Patient does speak English however his primary

language is French and Creole.  He denies a past medical history of anemia or 

requiring a blood transfusion in the past.





- Imaging and Cardiology


EKG: report reviewed (Sinus rhythm)





CT abdomen pelvis w con INDICATION: IMPRESSION: 1. No acute findings. 





CT SCAN OF THE HEAD: IMPRESSION: Normal CT scan of the brain Opacified left 

maxillary sinus with thickened bony walls suggesting chronic inflammatory 

changes 





CHEST 2 VIEWS: IMPRESSION: No acute finding 





Plan





Severe Anemia Unknown Source


-Hb: 4.7g/dl 


-3 units transfused in the ER 


-Monitor labs


-Heme consult


-GI consult





Headache


-x 2 months


-Accompanied c/ dizziness


-CT head negative


-PRN pain med


-Advised to stop Excedrin 





Nausea/ vomiting


-Anti-emetic prn


-IV fluids


-Supportive care





Hyperkalemia


-Monitor. May need kayxalate.





Severe Protein Calorie Malnutrition/Weightloss- Unkown etiology


Dietician consult.





Atypical Chest pain Could be secondary to demand


-x 3 weeks


- will admit to telemetry bed


- monitor with serial CE and EKG


-  Statin


- Stop ordered asa in the setting of Severe Anemia


- as needed SL NTG and iv morphin for pain


- Monitor BP, 


 


 DVT prophylaxis


-SCDs bilateral extremities


-Patient ambulatory





Advance Directives: No


VTE prophylaxis?: Mechanical


Plan of care discussed with patient/family: Yes








Hospitalist Physical





- Constitutional


Vitals: 


                                        











Temp Pulse Resp BP Pulse Ox


 


 99.3 F   88   18   129/71   100 


 


 02/10/20 06:01  02/10/20 06:01  02/10/20 06:01  02/10/20 06:01  02/10/20 06:01














Results





- Labs


CBC & Chem 7: 


                                 02/09/20 16:00





                                 02/10/20 07:00


Labs: 


                             Laboratory Last Values











WBC  8.5 K/mm3 (4.5-11.0)   02/09/20  16:00    


 


RBC  1.41 M/mm3 (3.65-5.03)  L  02/09/20  16:00    


 


Hgb  4.7 gm/dl (11.8-15.2)  L*  02/09/20  16:00    


 


Hct  13.9 % (35.5-45.6)  L*  02/09/20  16:00    


 


MCV  98 fl (84-94)  H  02/09/20  16:00    


 


MCH  33 pg (28-32)  H  02/09/20  16:00    


 


MCHC  34 % (32-34)   02/09/20  16:00    


 


RDW  34.6 % (13.2-15.2)  H  02/09/20  16:00    


 


Plt Count  131 K/mm3 (140-440)  L  02/09/20  16:00    


 


Add Manual Diff  Complete   02/09/20  16:00    


 


Total Counted  100   02/09/20  16:00    


 


Seg Neuts % (Manual)  43.0 % (40.0-70.0)   02/09/20  16:00    


 


Band Neutrophils %  0 %  02/09/20  16:00    


 


Lymphocytes % (Manual)  53.0 % (13.4-35.0)  H  02/09/20  16:00    


 


Reactive Lymphs % (Man)  0 %  02/09/20  16:00    


 


Monocytes % (Manual)  3.0 % (0.0-7.3)   02/09/20  16:00    


 


Eosinophils % (Manual)  1.0 % (0.0-4.3)   02/09/20  16:00    


 


Basophils % (Manual)  0 % (0.0-1.8)   02/09/20  16:00    


 


Metamyelocytes %  0 %  02/09/20  16:00    


 


Myelocytes %  0 %  02/09/20  16:00    


 


Promyelocytes %  0 %  02/09/20  16:00    


 


Blast Cells %  0 %  02/09/20  16:00    


 


Nucleated RBC %  Not Reportable   02/09/20  16:00    


 


Seg Neutrophils # Man  3.7 K/mm3 (1.8-7.7)   02/09/20  16:00    


 


Band Neutrophils #  0.0 K/mm3  02/09/20  16:00    


 


Lymphocytes # (Manual)  4.5 K/mm3 (1.2-5.4)   02/09/20  16:00    


 


Abs React Lymphs (Man)  0.0 K/mm3  02/09/20  16:00    


 


Monocytes # (Manual)  0.3 K/mm3 (0.0-0.8)   02/09/20  16:00    


 


Eosinophils # (Manual)  0.1 K/mm3 (0.0-0.4)   02/09/20  16:00    


 


Basophils # (Manual)  0.0 K/mm3 (0.0-0.1)   02/09/20  16:00    


 


Metamyelocytes #  0.0 K/mm3  02/09/20  16:00    


 


Myelocytes #  0.0 K/mm3  02/09/20  16:00    


 


Promyelocytes #  0.0 K/mm3  02/09/20  16:00    


 


Blast Cells #  0.0 K/mm3  02/09/20  16:00    


 


WBC Morphology  Not Reportable   02/09/20  16:00    


 


Hypersegmented Neuts  Not Reportable   02/09/20  16:00    


 


Hyposegmented Neuts  Not Reportable   02/09/20  16:00    


 


Hypogranular Neuts  Not Reportable   02/09/20  16:00    


 


Smudge Cells  Not Reportable   02/09/20  16:00    


 


Toxic Granulation  Not Reportable   02/09/20  16:00    


 


Toxic Vacuolation  Not Reportable   02/09/20  16:00    


 


Dohle Bodies  Not Reportable   02/09/20  16:00    


 


Pelger-Huet Anomaly  Not Reportable   02/09/20  16:00    


 


Estefania Rods  Not Reportable   02/09/20  16:00    


 


Platelet Estimate  Not Reportable   02/09/20  16:00    


 


Clumped Platelets  Not Reportable   02/09/20  16:00    


 


Plt Clumps, EDTA  Not Reportable   02/09/20  16:00    


 


Large Platelets  Not Reportable   02/09/20  16:00    


 


Giant Platelets  Not Reportable   02/09/20  16:00    


 


Platelet Satelliting  Not Reportable   02/09/20  16:00    


 


Plt Morphology Comment  Not Reportable   02/09/20  16:00    


 


RBC Morphology  Not Reportable   02/09/20  16:00    


 


Dimorphic RBCs  Not Reportable   02/09/20  16:00    


 


Polychromasia  1+   02/09/20  16:00    


 


Hypochromasia  Not Reportable   02/09/20  16:00    


 


Poikilocytosis  Not Reportable   02/09/20  16:00    


 


Anisocytosis  1+   02/09/20  16:00    


 


Microcytosis  Not Reportable   02/09/20  16:00    


 


Macrocytosis  Not Reportable   02/09/20  16:00    


 


Spherocytes  1+   02/09/20  16:00    


 


Pappenheimer Bodies  Not Reportable   02/09/20  16:00    


 


Sickle Cells  Not Reportable   02/09/20  16:00    


 


Target Cells  1+   02/09/20  16:00    


 


Tear Drop Cells  1+   02/09/20  16:00    


 


Ovalocytes  Not Reportable   02/09/20  16:00    


 


Helmet Cells  Not Reportable   02/09/20  16:00    


 


Steen-Hendrix Bodies  Not Reportable   02/09/20  16:00    


 


Cabot Rings  Not Reportable   02/09/20  16:00    


 


[place] Cells  Not Reportable   02/09/20  16:00    


 


Bite Cells  Not Reportable   02/09/20  16:00    


 


Crenated Cell  Not Reportable   02/09/20  16:00    


 


Elliptocytes  1+   02/09/20  16:00    


 


Acanthocytes (Spur)  Not Reportable   02/09/20  16:00    


 


Rouleaux  Not Reportable   02/09/20  16:00    


 


Hemoglobin C Crystals  Not Reportable   02/09/20  16:00    


 


Schistocytes  Not Reportable   02/09/20  16:00    


 


Malaria parasites  Not Reportable   02/09/20  16:00    


 


Guille Bodies  Not Reportable   02/09/20  16:00    


 


Hem Pathologist Commnt  No   02/09/20  16:00    


 


PT  13.9 Sec. (12.2-14.9)   02/09/20  16:00    


 


INR  1.06  (0.87-1.13)   02/09/20  16:00    


 


APTT  25.9 Sec. (24.2-36.6)   02/09/20  16:00    


 


Sodium  139 mmol/L (137-145)   02/10/20  07:00    


 


Potassium  5.1 mmol/L (3.6-5.0)  H D 02/10/20  07:00    


 


Chloride  103.6 mmol/L ()   02/10/20  07:00    


 


Carbon Dioxide  22 mmol/L (22-30)   02/10/20  07:00    


 


Anion Gap  19 mmol/L  02/10/20  07:00    


 


BUN  11 mg/dL (9-20)   02/10/20  07:00    


 


Creatinine  0.8 mg/dL (0.8-1.5)   02/10/20  07:00    


 


Estimated GFR  > 60 ml/min  02/10/20  07:00    


 


BUN/Creatinine Ratio  14 %  02/10/20  07:00    


 


Glucose  103 mg/dL ()  H  02/10/20  07:00    


 


Calcium  9.1 mg/dL (8.4-10.2)   02/10/20  07:00    


 


Phosphorus  4.40 mg/dL (2.5-4.5)   02/09/20  14:25    


 


Magnesium  1.90 mg/dL (1.7-2.3)   02/09/20  16:00    


 


Iron  151 ug/dL ()   02/09/20  23:12    


 


TIBC  128 mcg/dL (250-450)  L  02/09/20  23:12    


 


Ferritin  448.1 ng/mL (13.0-400.0)  H  02/09/20  23:12    


 


Total Bilirubin  1.30 mg/dL (0.1-1.2)  H  02/09/20  14:25    


 


AST  76 units/L (5-40)  H  02/09/20  14:25    


 


ALT  38 units/L (7-56)   02/09/20  14:25    


 


Alkaline Phosphatase  62 units/L ()   02/09/20  14:25    


 


Troponin T  < 0.010 ng/mL (0.00-0.029)   02/10/20  00:56    


 


Total Protein  7.4 g/dL (6.3-8.2)   02/09/20  14:25    


 


Albumin  4.4 g/dL (3.9-5)   02/09/20  14:25    


 


Albumin/Globulin Ratio  1.5 %  02/09/20  14:25    


 


Lipase  10 units/L (13-60)  L  02/09/20  16:00    


 


TSH  1.680 mlU/mL (0.270-4.200)   02/09/20  14:25    


 


HIV 1&2 Antibody Rapid  Non react  (Non React)   02/09/20  23:12    


 


HIV P24 Antigen  Non react  (Non React)   02/09/20  23:12    


 


Blood Type  O POSITIVE   02/09/20  16:00    


 


Antibody Screen  Negative   02/09/20  16:00    


 


Crossmatch  See Detail   02/09/20  16:00    














Active Medications





- Current Medications


Current Medications: 














Generic Name Dose Route Start Last Admin





  Trade Name Freq  PRN Reason Stop Dose Admin


 


Acetaminophen  650 mg  02/09/20 21:35 





  Tylenol  PO  





  Q4H PRN  





  Pain MILD(1-3)/Fever >100.5/HA  


 


Aspirin  325 mg  02/10/20 10:00 





  Ecotrin  PO  





  QDAY DOUGLAS  


 


Atorvastatin Calcium  40 mg  02/09/20 22:00  02/10/20 01:26





  Lipitor  PO   40 mg





  QHS DOUGLAS   Administration


 


Famotidine  20 mg  02/09/20 22:00  02/10/20 01:27





  Pepcid  IV   20 mg





  BID DOUGLAS   Administration


 


Hydralazine HCl  10 mg  02/09/20 21:37 





  Apresoline  IV  





  Q4H PRN  





  Hypertension  


 


Sodium Chloride  1,000 mls @ 125 mls/hr  02/10/20 04:59  02/10/20 06:00





  Nacl 0.9% 1000 Ml  IV  02/10/20 12:58  125 mls/hr





  ONCE ONE   Administration


 


Morphine Sulfate  2 mg  02/09/20 21:35 





  Morphine  IV  





  Q4H PRN  





  Pain, Moderate (4-6)  


 


Ondansetron HCl  4 mg  02/09/20 21:35 





  Zofran  IV  





  Q8H PRN  





  Nausea And Vomiting

## 2020-02-10 NOTE — EVENT NOTE
Date: 02/10/20





anemia


wbc normal


def IX


jodie





[id number]





called pt - reg low b12 - adv to come to clinic - OTC s/l b12


d/w pt and sister drowsy

## 2020-02-11 NOTE — CONSULTATION
REFERRED BY:  ____



HISTORY OF PRESENT ILLNESS:  I saw the patient, a 50-year-old male in the

medical floor.  The patient came to the hospital because of headache, chest pain

for a few weeks.  He also had nausea and vomiting.  He had a history of chest

pain.  During this admission, he was found to be very anemic.  He was given

transfusion.  He also was found to have low platelet count.  I have been asked

to evaluate the patient for this.  At this time, he is feeling better post

transfusion.  The patient is from [facility].  No hematemesis, no hematochezia.  No

other bleeding issues.



PAST SURGICAL HISTORY:  Nil significant.



PAST SURGICAL HISTORY:  Nil.



SOCIAL HISTORY:  Smoking present.



FAMILY HISTORY:  Diabetes and hypertension.



ALLERGIES:  None.



MEDICATIONS:  No home medications.



PHYSICAL EXAMINATION:

VITAL SIGNS:  Temperature 98, pulse 69, respirations 16, and /67.

HEENT:  Pallor present, no icterus.

NECK:  No neck lymph nodes.

HEART:  S1, S2.

LUNGS:  Clear to auscultation.

ABDOMEN:  Soft.

EXTREMITIES:  No calf tenderness.



LABORATORY DATA:  White cell 8.9, hemoglobin 4.5 and posttransfusion 7.8, MCV

97, platelet 139, potassium 5.1, creatinine 0.8, and calcium 9.1.  Serum iron

151, ferritin 448.  B12 150, folate 5.89.  HIV negative.



ASSESSMENT AND PLAN:

1.  Anemia, thrombocytopenia.  MCV is high.  B12 is low.  Folate is low risk.  I

called the patient as he was already discharged regarding the need to make an

appointment to take supplement.  I explained to him regarding over-the-counter

sublingual B12 and folic acid.

2.  History of chest pain, headache.  This may be secondary to anemia.

3.  He had hyperkalemia at one time.

4.  I will follow the patient in the clinic setting.





DD: 02/11/2020 07:31

DT: 02/11/2020 07:43

JOB# [id number]  [id number]

NM/NTS